# Patient Record
Sex: MALE | Employment: OTHER | URBAN - METROPOLITAN AREA
[De-identification: names, ages, dates, MRNs, and addresses within clinical notes are randomized per-mention and may not be internally consistent; named-entity substitution may affect disease eponyms.]

---

## 2021-07-09 ENCOUNTER — HOSPITAL ENCOUNTER (INPATIENT)
Age: 36
LOS: 1 days | Discharge: LEFT BEFORE DISCHARGE | DRG: 885 | End: 2021-07-10
Attending: EMERGENCY MEDICINE | Admitting: PSYCHIATRY & NEUROLOGY
Payer: COMMERCIAL

## 2021-07-09 ENCOUNTER — APPOINTMENT (OUTPATIENT)
Dept: CT IMAGING | Age: 36
End: 2021-07-09
Attending: EMERGENCY MEDICINE
Payer: COMMERCIAL

## 2021-07-09 ENCOUNTER — HOSPITAL ENCOUNTER (EMERGENCY)
Age: 36
Discharge: LEFT BEFORE DISCHARGE | End: 2021-07-09
Attending: EMERGENCY MEDICINE
Payer: COMMERCIAL

## 2021-07-09 VITALS
RESPIRATION RATE: 18 BRPM | DIASTOLIC BLOOD PRESSURE: 79 MMHG | OXYGEN SATURATION: 100 % | SYSTOLIC BLOOD PRESSURE: 125 MMHG | HEART RATE: 77 BPM

## 2021-07-09 DIAGNOSIS — R55 VASOVAGAL SYNCOPE: Primary | ICD-10-CM

## 2021-07-09 DIAGNOSIS — F20.9 SCHIZOPHRENIA, UNSPECIFIED TYPE (HCC): ICD-10-CM

## 2021-07-09 DIAGNOSIS — F99 MENTAL HEALTH DISORDER: Primary | ICD-10-CM

## 2021-07-09 DIAGNOSIS — Z53.29 LEFT AGAINST MEDICAL ADVICE: ICD-10-CM

## 2021-07-09 LAB
ALBUMIN SERPL-MCNC: 4.5 G/DL (ref 3.5–5)
ALBUMIN/GLOB SERPL: 1.1 {RATIO} (ref 1.1–2.2)
ALP SERPL-CCNC: 99 U/L (ref 45–117)
ALT SERPL-CCNC: 28 U/L (ref 12–78)
ANION GAP SERPL CALC-SCNC: 4 MMOL/L (ref 5–15)
APAP SERPL-MCNC: <2 UG/ML (ref 10–30)
AST SERPL-CCNC: 12 U/L (ref 15–37)
ATRIAL RATE: 71 BPM
BASOPHILS # BLD: 0 K/UL (ref 0–0.1)
BASOPHILS NFR BLD: 0 % (ref 0–1)
BILIRUB SERPL-MCNC: 0.8 MG/DL (ref 0.2–1)
BUN SERPL-MCNC: 9 MG/DL (ref 6–20)
BUN/CREAT SERPL: 11 (ref 12–20)
CALCIUM SERPL-MCNC: 10.1 MG/DL (ref 8.5–10.1)
CALCULATED P AXIS, ECG09: 68 DEGREES
CALCULATED R AXIS, ECG10: 50 DEGREES
CALCULATED T AXIS, ECG11: 43 DEGREES
CHLORIDE SERPL-SCNC: 101 MMOL/L (ref 97–108)
CO2 SERPL-SCNC: 30 MMOL/L (ref 21–32)
COMMENT, HOLDF: NORMAL
CREAT SERPL-MCNC: 0.81 MG/DL (ref 0.7–1.3)
DIAGNOSIS, 93000: NORMAL
DIFFERENTIAL METHOD BLD: ABNORMAL
EOSINOPHIL # BLD: 0.1 K/UL (ref 0–0.4)
EOSINOPHIL NFR BLD: 1 % (ref 0–7)
ERYTHROCYTE [DISTWIDTH] IN BLOOD BY AUTOMATED COUNT: 13.2 % (ref 11.5–14.5)
ETHANOL SERPL-MCNC: <10 MG/DL
GLOBULIN SER CALC-MCNC: 4 G/DL (ref 2–4)
GLUCOSE SERPL-MCNC: 108 MG/DL (ref 65–100)
HCT VFR BLD AUTO: 45.8 % (ref 36.6–50.3)
HGB BLD-MCNC: 15.6 G/DL (ref 12.1–17)
IMM GRANULOCYTES # BLD AUTO: 0 K/UL (ref 0–0.04)
IMM GRANULOCYTES NFR BLD AUTO: 0 % (ref 0–0.5)
LACTATE SERPL-SCNC: 1 MMOL/L (ref 0.4–2)
LYMPHOCYTES # BLD: 1.7 K/UL (ref 0.8–3.5)
LYMPHOCYTES NFR BLD: 21 % (ref 12–49)
MAGNESIUM SERPL-MCNC: 2.5 MG/DL (ref 1.6–2.4)
MCH RBC QN AUTO: 30.4 PG (ref 26–34)
MCHC RBC AUTO-ENTMCNC: 34.1 G/DL (ref 30–36.5)
MCV RBC AUTO: 89.1 FL (ref 80–99)
MONOCYTES # BLD: 0.3 K/UL (ref 0–1)
MONOCYTES NFR BLD: 4 % (ref 5–13)
NEUTS SEG # BLD: 5.8 K/UL (ref 1.8–8)
NEUTS SEG NFR BLD: 74 % (ref 32–75)
NRBC # BLD: 0 K/UL (ref 0–0.01)
NRBC BLD-RTO: 0 PER 100 WBC
P-R INTERVAL, ECG05: 148 MS
PLATELET # BLD AUTO: 164 K/UL (ref 150–400)
PMV BLD AUTO: 11.5 FL (ref 8.9–12.9)
POTASSIUM SERPL-SCNC: 4.4 MMOL/L (ref 3.5–5.1)
PROT SERPL-MCNC: 8.5 G/DL (ref 6.4–8.2)
Q-T INTERVAL, ECG07: 396 MS
QRS DURATION, ECG06: 86 MS
QTC CALCULATION (BEZET), ECG08: 430 MS
RBC # BLD AUTO: 5.14 M/UL (ref 4.1–5.7)
SALICYLATES SERPL-MCNC: <1.7 MG/DL (ref 2.8–20)
SAMPLES BEING HELD,HOLD: NORMAL
SODIUM SERPL-SCNC: 135 MMOL/L (ref 136–145)
TROPONIN I SERPL-MCNC: <0.05 NG/ML
VENTRICULAR RATE, ECG03: 71 BPM
WBC # BLD AUTO: 8 K/UL (ref 4.1–11.1)

## 2021-07-09 PROCEDURE — 93005 ELECTROCARDIOGRAM TRACING: CPT

## 2021-07-09 PROCEDURE — 83605 ASSAY OF LACTIC ACID: CPT

## 2021-07-09 PROCEDURE — 84443 ASSAY THYROID STIM HORMONE: CPT

## 2021-07-09 PROCEDURE — 82077 ASSAY SPEC XCP UR&BREATH IA: CPT

## 2021-07-09 PROCEDURE — 83735 ASSAY OF MAGNESIUM: CPT

## 2021-07-09 PROCEDURE — 80053 COMPREHEN METABOLIC PANEL: CPT

## 2021-07-09 PROCEDURE — 80179 DRUG ASSAY SALICYLATE: CPT

## 2021-07-09 PROCEDURE — 80143 DRUG ASSAY ACETAMINOPHEN: CPT

## 2021-07-09 PROCEDURE — 74011250636 HC RX REV CODE- 250/636: Performed by: EMERGENCY MEDICINE

## 2021-07-09 PROCEDURE — 96360 HYDRATION IV INFUSION INIT: CPT

## 2021-07-09 PROCEDURE — 36415 COLL VENOUS BLD VENIPUNCTURE: CPT

## 2021-07-09 PROCEDURE — 85025 COMPLETE CBC W/AUTO DIFF WBC: CPT

## 2021-07-09 PROCEDURE — 99285 EMERGENCY DEPT VISIT HI MDM: CPT

## 2021-07-09 PROCEDURE — 99283 EMERGENCY DEPT VISIT LOW MDM: CPT

## 2021-07-09 PROCEDURE — 84484 ASSAY OF TROPONIN QUANT: CPT

## 2021-07-09 RX ADMIN — SODIUM CHLORIDE 1000 ML: 9 INJECTION, SOLUTION INTRAVENOUS at 14:23

## 2021-07-09 NOTE — ED PROVIDER NOTES
71-year-old male with a history of schizoaffective schizophrenia presents to the emergency department by EMS for evaluation of a witnessed syncopal episode which occurred reportedly prior to arrival. He states that this happened while he was brushing his teeth when he said that he suddenly became lightheaded and passed out briefly. He states that he awoke on the floor and had no evidence of tongue biting or incontinence. No history of seizures. Blood glucose was 113 by EMS and he was ANO x3 throughout transport and on arrival to the ED. He states that he is traveling with his wife from [place] to [place] where they live. He denies any preceding chest pain, palpitations, shortness of breath, or preceding illness to this episode. He states that he now feels completely fine. Syncope   Associated symptoms include light-headedness. Pertinent negatives include no chest pain, no fever, no abdominal pain, no nausea, no vomiting, no congestion, no headaches, no back pain, no dizziness and no weakness. His past medical history is significant for syncope. Past Medical History:   Diagnosis Date    Schizo-affective schizophrenia [facility])        Past Surgical History:   Procedure Laterality Date    HX ORTHOPAEDIC      Right Leg         History reviewed. No pertinent family history.     Social History     Socioeconomic History    Marital status: Not on file     Spouse name: Not on file    Number of children: Not on file    Years of education: Not on file    Highest education level: Not on file   Occupational History    Not on file   Tobacco Use    Smoking status: Current Some Day Smoker    Smokeless tobacco: Never Used   Substance and Sexual Activity    Alcohol use: Not Currently    Drug use: Not Currently    Sexual activity: Not on file   Other Topics Concern    Not on file   Social History Narrative    Not on file     Social Determinants of Health     Financial Resource Strain:     Difficulty of Paying Living Expenses:    Food Insecurity:     Worried About [address] [facility] in the Last Year:     [address] in the Last Year:    Transportation Needs:     Lack of Transportation (Medical):  Lack of Transportation (Non-Medical):    Physical Activity:     Days of Exercise per Week:     Minutes of Exercise per Session:    Stress:     Feeling of Stress :    Social Connections:     Frequency of Communication with Friends and Family:     Frequency of Social Gatherings with Friends and Family:     Attends Nondenominational Services:     Active Member of Clubs or Organizations:     Attends Club or Organization Meetings:     Marital Status:    Intimate Partner Violence:     Fear of Current or Ex-Partner:     Emotionally Abused:     Physically Abused:     Sexually Abused: ALLERGIES: Patient has no known allergies. Review of Systems   Constitutional: Positive for activity change. Negative for appetite change, chills and fever. HENT: Negative for congestion, rhinorrhea, sinus pain, sneezing and sore throat. Eyes: Negative for photophobia and visual disturbance. Respiratory: Negative for cough and shortness of breath. Cardiovascular: Positive for syncope. Negative for chest pain. Gastrointestinal: Negative for abdominal pain, blood in stool, constipation, diarrhea, nausea and vomiting. Genitourinary: Negative for difficulty urinating, dysuria, flank pain, hematuria, penile pain and testicular pain. Musculoskeletal: Negative for arthralgias, back pain, myalgias and neck pain. Skin: Negative for rash and wound. Neurological: Positive for syncope and light-headedness. Negative for dizziness, facial asymmetry, speech difficulty, weakness, numbness and headaches. Psychiatric/Behavioral: Negative for self-injury and suicidal ideas. All other systems reviewed and are negative.       Vitals:    07/09/21 1335 07/09/21 1336 07/09/21 1400   BP: 125/73  129/75   Pulse: 74  73   Resp: 16 15   SpO2: 100% 100% 99%            Physical Exam  Vitals and nursing note reviewed. Constitutional:       General: He is not in acute distress. Appearance: Normal appearance. He is well-developed. He is not diaphoretic. HENT:      Head: Normocephalic and atraumatic. Nose: Nose normal.      Mouth/Throat:      Comments: No evidence of tongue trauma. Eyes:      Extraocular Movements: Extraocular movements intact. Conjunctiva/sclera: Conjunctivae normal.      Pupils: Pupils are equal, round, and reactive to light. Cardiovascular:      Rate and Rhythm: Normal rate and regular rhythm. Heart sounds: Normal heart sounds. Pulmonary:      Effort: Pulmonary effort is normal.      Breath sounds: Normal breath sounds. Abdominal:      General: There is no distension. Palpations: Abdomen is soft. Tenderness: There is no abdominal tenderness. Musculoskeletal:         General: No tenderness. Cervical back: Normal range of motion and neck supple. Skin:     General: Skin is warm and dry. Neurological:      General: No focal deficit present. Mental Status: He is alert and oriented to person, place, and time. Cranial Nerves: No cranial nerve deficit. Sensory: No sensory deficit. Motor: No weakness. Coordination: Coordination normal.      Comments: Intact sensation, 5/5 strength in all 4 extremities, intact finger to nose, neg pronator drift, fluent speech, CN intact. MDM   39-year-old male presents with syncopal episode prior to arrival while brushing teeth. Suspect vasovagal type etiology given no focal neuro deficits and no evidence of seizure. Labs returned reassuringly showing no significant abnormalities, no leukocytosis or lactic acidosis, negative troponin.     While these results were discussed with the patient at the bedside his wife states that he has been acting more erratic and disorganized recently and has been noncompliant with his medications over the last couple of days. No expressed SI or HI but she is concerned that his mental health is declining and requests that he be evaluated by [facility]. BSMART was consulted and saw the patient at the bedside and they were concerned about his mental health progressing to more disorganized schizophrenia so they said that they would further discuss admission options. Patient was not interested in being admitted and this is not going to be  Voluntary. Reportedly while the patient's wife was discussing his symptoms in the quiet room with BSMART, the patient eloped. Procedures    1333 EKG shows normal sinus rhythm with a rate of 71 bpm with no acute ST or T wave abnormalities suggestive of ischemia. 4:45 I was notified by nursing staff that it appears that the patient may have eloped. Security was called to try to locate the patient.

## 2021-07-09 NOTE — ED NOTES
Came out of another room and patient was not in room. Wife states she does not know where he went. Bsmart aware and security notified.

## 2021-07-09 NOTE — ED NOTES
Pt's wife concerned about patient's mental health. Dr Moshe Helms to bedside and will consult BSmart.

## 2021-07-09 NOTE — ED TRIAGE NOTES
Triage Note: Patient arrives by EMS from a hotel following a witnessed syncopal episode. Patient A & O x 3 upon arrival.  per EMS. Patient reports he is traveling with his wife from [place] to [place] where they live.

## 2021-07-09 NOTE — BSMART NOTE
Comprehensive Assessment Form Part 1      Section I - Disposition    Axis I - Psychotic Disorder, Unspecified; Unspecified Mood Disorder    Axis II - Deferred  Axis III - Vasovagal Syncope      The Medical Doctor to Psychiatrist conference was not completed. The Medical Doctor is in agreement with Psychiatrist disposition because of (reason) N/A. The plan is request for TDO evaluation. The on-call Psychiatrist consulted was Dr. Boogie Duarte . The admitting Psychiatrist will be Dr. Deepa Marino. The admitting Diagnosis is Psychotic Disorder, NOS  The Payor source is unknown. Section II - Integrated Summary  Summary:      Patient is a 77-year-old male who was brought to ER by EMS following a syncopal episode in which he fell in his hotel bathroom. As patient was being prepared for discharge from the ER, patient's wife Peter Ceballos) expressed concerns about his behavior, adding that he was recently diagnosed with schizophrenia, prompting a request for a mental-health evaluation. In current interview, patient initially presented as very sleepy, nearly incoherent, but he did give writer verbal permission to speak with patient's wife. Wife repots that patient was \"normal\" until [place] three months ago,\" at which time he began to exhibit unusual behavior, such as talking to himself, increasing agitation and irritability, isolation, mood instability with episodes of crying. She reports that Saumya Matias was [employer] in [place], sent to [facility] involuntarily about three weeks ago. She states that he was released \"with a cocktail of medications\" that \"he refused to take,\" agreeing only to take Trazodone and Clonazepam.  When asked, wife stated that \"cocktail\" was her word, and that she can't remember the names of the other prescribed medication.     Patient and family were in [address] [place] [address] visiting family, but symptoms reportedly got worse, with patient beginning to \"elaborate stories,\" to explain his behavior, locking himself in the bathroom for hours, staring at himself in the mirror, crying for hours for no clear reason. \"He goes into a really dark place, won't answer. \"  \"He's up and down,\" with a significant decrease in appetite. Patient reportedly was so anxious last night that he didn't sleep at all, was crying all night but not able to explain why. Today, patient reportedly was repeatedly looking under the mattress and in the closet in their motel room, then went into the bathroom, locking himself in and, eventually, falling, prompting wife to call EMS. 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 She reports that she's known him for 10 years, with no previous bizarre behavior. She stated that patient's grandmother Memo Vanceon a nervous breakdown,\" and that an aunt has Bipolar Disorder. Regarding current situation, wife states, \"I don't feel safe with him,\" and, \"We have a 15-hour drive. She stated that \"he gets really angry\" in the car, e.g. at traffic. She states that it is not like him and scares her, adding that she is scared when he is driving. In current interview patient was wandering around his hospital room, was asked to get back into bed. He presented as distracted, confused, disoriented, stating only that he's \"in a hospital. In [place], I believe,\" and was unable to state the date or the month. He spoke softly with apparent thought blocking. He denied any problem aside from falling this morning, stating, \"I'm ready to leave. \"  Patient denied any previous hospitalizations but, when asked about hospitalization in [place], stated, \"Because I was hospitalized. Maybe I was hearing a voice. \"  He is unable to provide historical information, even about earlier today, unless asked about specific events.   Asked about his prescribed medication, he stated, \"Anxiety, but I told them that I don't have no anxiety. \"  Asked about his reported behavior at the hotel today, patient stated, \"Maybe I was making the bed or something. \"  \"I do talk to myself, but I know for sure there's nothing wrong with me. \"  He stated that he is not willing to be admitted to the hospital.    Because patient appears to lack capacity to consent to treatment necessitated by his inability to care for himself and his wife feeling unsafe with him, writer discussed TDO process with wife, who stated that she understands how it works--\"We've just been through that,\" and that she feels it is necessary. The plan is to call Andrea LANIER to request a TDO evaluation. Patient left the ER before a TDO evaluation could be requested. ER notified Security and [employer]. Patient's wife remains at the hospital.       The patient has not demonstrated mental capacity to provide informed consent. The information is given by the spouse/SO. The Chief Complaint is confusion, psychotic symptoms. The Precipitant Factors are unknown. Previous Hospitalizations: [facility], 6/2021. The patient has been in restraints in the past and has not escaped from them. Current Psychiatrist and/or  is N/A. Lethality Assessment:    The potential for suicide noted by the following: active psychosis . The potential for homicide is not noted. The patient has not been a perpetrator of sexual or physical abuse. There are not pending charges. The patient is felt to be at risk for self harm or harm to others. Section III - Psychosocial  The patient's overall mood and attitude is confused. Feelings of helplessness and hopelessness are not observed. Generalized anxiety is observed by family report. Panic is not observed. Phobias are not observed. Obsessive compulsive tendencies are observed by report of wife. Section IV - Mental Status Exam  The patient's appearance is tense. The patient's behavior is guarded.  The patient is disoriented. The patient's speech is soft. The patient's mood is withdrawn. The range of affect is constricted. The patient's thought content demonstrates paranoia. The thought process is blocking. The patient's perception demonstrated changes in the following: paranoid ideation. The patient's memory is impaired. The patient's appetite is decreased. The patient's sleep has evidence of insomnia. The patient shows no insight. The patient's judgement is psychologically impaired. Section V - Substance Abuse  The patient is not using substances. The patient is using tobacco by inhalation for greater than 10 years with last use on today. Section VI - Living Arrangements  The patient is . The spouses approximate age is 27 and appears to be in good health. The patient lives with a spouse. The patient has no children. The patient does plan to return home upon discharge. The patient does not have legal issues pending. The patient's source of income comes from employment. Spiritism and cultural practices have not been voiced at this time. The patient's greatest support comes from wife and this person will be involved with the treatment. The patient has not been in an event described as horrible or outside the realm of ordinary life experience either currently or in the past.  The patient has not been a victim of sexual/physical abuse. Section VII - Other Areas of Clinical Concern  The highest grade achieved is unknown with the overall quality of school experience being described as unknown. The patient is currently employed and speaks [place] as a primary language. The patient has no communication impairments affecting communication. The patient's preference for learning can be described as: can read and write adequately.   The patient's hearing is normal.  The patient's vision is normal.      Festus Dural

## 2021-07-10 ENCOUNTER — APPOINTMENT (OUTPATIENT)
Dept: CT IMAGING | Age: 36
DRG: 885 | End: 2021-07-10
Attending: EMERGENCY MEDICINE
Payer: COMMERCIAL

## 2021-07-10 ENCOUNTER — HOSPITAL ENCOUNTER (INPATIENT)
Age: 36
LOS: 4 days | Discharge: HOME | DRG: 885 | End: 2021-07-15
Attending: EMERGENCY MEDICINE | Admitting: PSYCHIATRY & NEUROLOGY
Payer: COMMERCIAL

## 2021-07-10 VITALS
DIASTOLIC BLOOD PRESSURE: 80 MMHG | OXYGEN SATURATION: 97 % | TEMPERATURE: 98.5 F | HEART RATE: 78 BPM | SYSTOLIC BLOOD PRESSURE: 134 MMHG | RESPIRATION RATE: 18 BRPM

## 2021-07-10 DIAGNOSIS — F48.9 MENTAL HEALTH PROBLEM: Primary | ICD-10-CM
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
## 2021-07-10 PROBLEM — F25.9 SCHIZOAFFECTIVE DISORDER (HCC): Status: ACTIVE | Noted: 2021-07-10

## 2021-07-10 LAB
ALBUMIN SERPL-MCNC: 3.9 G/DL (ref 3.5–5)
ALBUMIN/GLOB SERPL: 1.1 {RATIO} (ref 1.1–2.2)
ALP SERPL-CCNC: 88 U/L (ref 45–117)
ALT SERPL-CCNC: 23 U/L (ref 12–78)
AMPHET UR QL SCN: NEGATIVE
ANION GAP SERPL CALC-SCNC: 7 MMOL/L (ref 5–15)
AST SERPL-CCNC: 13 U/L (ref 15–37)
BARBITURATES UR QL SCN: NEGATIVE
BASOPHILS # BLD: 0 K/UL (ref 0–0.1)
BASOPHILS NFR BLD: 0 % (ref 0–1)
BENZODIAZ UR QL: NEGATIVE
BILIRUB SERPL-MCNC: 0.9 MG/DL (ref 0.2–1)
BUN SERPL-MCNC: 14 MG/DL (ref 6–20)
BUN/CREAT SERPL: 19 (ref 12–20)
CALCIUM SERPL-MCNC: 9.7 MG/DL (ref 8.5–10.1)
CANNABINOIDS UR QL SCN: NEGATIVE
CHLORIDE SERPL-SCNC: 104 MMOL/L (ref 97–108)
CK SERPL-CCNC: 72 U/L (ref 39–308)
CO2 SERPL-SCNC: 25 MMOL/L (ref 21–32)
COCAINE UR QL SCN: NEGATIVE
CREAT SERPL-MCNC: 0.75 MG/DL (ref 0.7–1.3)
DIFFERENTIAL METHOD BLD: NORMAL
DRUG SCRN COMMENT,DRGCM: NORMAL
EOSINOPHIL # BLD: 0.1 K/UL (ref 0–0.4)
EOSINOPHIL NFR BLD: 1 % (ref 0–7)
ERYTHROCYTE [DISTWIDTH] IN BLOOD BY AUTOMATED COUNT: 12.9 % (ref 11.5–14.5)
ETHANOL SERPL-MCNC: <10 MG/DL
FLUAV RNA SPEC QL NAA+PROBE: NOT DETECTED
FLUBV RNA SPEC QL NAA+PROBE: NOT DETECTED
GLOBULIN SER CALC-MCNC: 3.6 G/DL (ref 2–4)
GLUCOSE SERPL-MCNC: 88 MG/DL (ref 65–100)
HCT VFR BLD AUTO: 44.4 % (ref 36.6–50.3)
HGB BLD-MCNC: 15 G/DL (ref 12.1–17)
IMM GRANULOCYTES # BLD AUTO: 0 K/UL (ref 0–0.04)
IMM GRANULOCYTES NFR BLD AUTO: 0 % (ref 0–0.5)
LYMPHOCYTES # BLD: 2.1 K/UL (ref 0.8–3.5)
LYMPHOCYTES NFR BLD: 27 % (ref 12–49)
MCH RBC QN AUTO: 30 PG (ref 26–34)
MCHC RBC AUTO-ENTMCNC: 33.8 G/DL (ref 30–36.5)
MCV RBC AUTO: 88.8 FL (ref 80–99)
METHADONE UR QL: NEGATIVE
MONOCYTES # BLD: 0.5 K/UL (ref 0–1)
MONOCYTES NFR BLD: 7 % (ref 5–13)
NEUTS SEG # BLD: 5.2 K/UL (ref 1.8–8)
NEUTS SEG NFR BLD: 65 % (ref 32–75)
NRBC # BLD: 0 K/UL (ref 0–0.01)
NRBC BLD-RTO: 0 PER 100 WBC
OPIATES UR QL: NEGATIVE
PCP UR QL: NEGATIVE
PLATELET # BLD AUTO: 153 K/UL (ref 150–400)
PMV BLD AUTO: 11 FL (ref 8.9–12.9)
POTASSIUM SERPL-SCNC: 3.6 MMOL/L (ref 3.5–5.1)
PROT SERPL-MCNC: 7.5 G/DL (ref 6.4–8.2)
RBC # BLD AUTO: 5 M/UL (ref 4.1–5.7)
SARS-COV-2, COV2: NOT DETECTED
SODIUM SERPL-SCNC: 136 MMOL/L (ref 136–145)
TSH SERPL DL<=0.05 MIU/L-ACNC: 0.71 UIU/ML (ref 0.36–3.74)
WBC # BLD AUTO: 8 K/UL (ref 4.1–11.1)

## 2021-07-10 PROCEDURE — 87636 SARSCOV2 & INF A&B AMP PRB: CPT

## 2021-07-10 PROCEDURE — 72125 CT NECK SPINE W/O DYE: CPT

## 2021-07-10 PROCEDURE — 80307 DRUG TEST PRSMV CHEM ANLYZR: CPT

## 2021-07-10 PROCEDURE — 99285 EMERGENCY DEPT VISIT HI MDM: CPT

## 2021-07-10 PROCEDURE — 65270000029 HC RM PRIVATE

## 2021-07-10 PROCEDURE — 82550 ASSAY OF CK (CPK): CPT

## 2021-07-10 PROCEDURE — 90791 PSYCH DIAGNOSTIC EVALUATION: CPT

## 2021-07-10 PROCEDURE — 70450 CT HEAD/BRAIN W/O DYE: CPT

## 2021-07-10 PROCEDURE — 36415 COLL VENOUS BLD VENIPUNCTURE: CPT

## 2021-07-10 PROCEDURE — 85025 COMPLETE CBC W/AUTO DIFF WBC: CPT

## 2021-07-10 PROCEDURE — 80053 COMPREHEN METABOLIC PANEL: CPT

## 2021-07-10 PROCEDURE — 82077 ASSAY SPEC XCP UR&BREATH IA: CPT

## 2021-07-10 RX ORDER — HYDROXYZINE 25 MG/1
50 TABLET, FILM COATED ORAL
Status: DISCONTINUED | OUTPATIENT
Start: 2021-07-10 | End: 2021-07-10 | Stop reason: HOSPADM

## 2021-07-10 RX ORDER — ADHESIVE BANDAGE
30 BANDAGE TOPICAL DAILY PRN
Status: DISCONTINUED | OUTPATIENT
Start: 2021-07-10 | End: 2021-07-10 | Stop reason: HOSPADM

## 2021-07-10 RX ORDER — TRAZODONE HYDROCHLORIDE 50 MG/1
50 TABLET ORAL
Status: DISCONTINUED | OUTPATIENT
Start: 2021-07-10 | End: 2021-07-10 | Stop reason: HOSPADM

## 2021-07-10 RX ORDER — OLANZAPINE 5 MG/1
5 TABLET ORAL
Status: DISCONTINUED | OUTPATIENT
Start: 2021-07-10 | End: 2021-07-10 | Stop reason: HOSPADM

## 2021-07-10 RX ORDER — LORAZEPAM 2 MG/ML
1 INJECTION INTRAMUSCULAR
Status: DISCONTINUED | OUTPATIENT
Start: 2021-07-10 | End: 2021-07-10 | Stop reason: HOSPADM

## 2021-07-10 RX ORDER — ACETAMINOPHEN 325 MG/1
650 TABLET ORAL
Status: DISCONTINUED | OUTPATIENT
Start: 2021-07-10 | End: 2021-07-10 | Stop reason: HOSPADM

## 2021-07-10 RX ORDER — HALOPERIDOL 5 MG/ML
5 INJECTION INTRAMUSCULAR
Status: DISCONTINUED | OUTPATIENT
Start: 2021-07-10 | End: 2021-07-10 | Stop reason: HOSPADM

## 2021-07-10 RX ORDER — BENZTROPINE MESYLATE 1 MG/1
1 TABLET ORAL
Status: DISCONTINUED | OUTPATIENT
Start: 2021-07-10 | End: 2021-07-10 | Stop reason: HOSPADM

## 2021-07-10 RX ORDER — DIPHENHYDRAMINE HYDROCHLORIDE 50 MG/ML
50 INJECTION, SOLUTION INTRAMUSCULAR; INTRAVENOUS
Status: DISCONTINUED | OUTPATIENT
Start: 2021-07-10 | End: 2021-07-10 | Stop reason: HOSPADM

## 2021-07-10 NOTE — PROGRESS NOTES
TRANSFER - IN REPORT:    Verbal report received from [facility], on [facility]  being received from [facility](unit) for routine progression of care      Report consisted of patients Situation, Background, Assessment and   Recommendations(SBAR). Information from the following report(s) SBAR was reviewed with the receiving nurse. Opportunity for questions and clarification was provided. Assessment completed upon patients arrival to unit and care assumed.

## 2021-07-10 NOTE — BSMART NOTE
Comprehensive Assessment Form Part 1    Section I - Disposition    Axis I - Schizoaffective Disorder ([facility] [address] Bipolar Type, with Catatonia; R/O Bipolar 1 Disorder, Severe, with mood-congruent psychotic features, with catatonia (staring, negativism and mannerism)  Axis II - Deferred  Axis III -   Past Medical History:   Diagnosis Date    Schizo-affective schizophrenia ([facility] [address]    Axis IV - Lack of sufficient MH hx in ED setting    The Medical Doctor to Psychiatrist conference was not completed. The Medical Doctor is in agreement with Psychiatrist disposition because of (reason) pt presents to ED on a VOL basis with psychosis. The plan is to present pt for admission on a VOL basis. The on-call Psychiatrist consulted was Dr. Adore Schmitt  The admitting Psychiatrist will be Dr. Adore Schmitt  The admitting Diagnosis is:  Schizoaffective Disorder ([facility] [address] Bipolar Type, with Catatonia; R/O Bipolar 1 Disorder, Severe, with mood-congruent psychotic features, with catatonia (staring, negativism and mannerism)  The Payor source is: Coverage information not on file     Section II - Integrated Summary  Summary:  Pt is a 38 yo SWM who presents to the ED on a VOL basis for a second evaluation by [facility] in a five hour period. Pt denies SI/HI/AVH to this writer and is oriented to person and place, but not day/date of the week or situation. Pt reports that he and his wife were passing through [place] on vacation and he passed out after he \"\"ate something\" this morning. He states that his wife brought him to the ED to have him medically examined and expressed her concern for pt's \"bizarre\" and \"uncharacteristic\" behavior. Pt gives this writer permission to contact his wife, Arminda Vazquez, who checked into a hotel after pt eloped AMA from the ED at 17:25. Pt represented to the ED at 20:15, was escorted into the ED by [facility] and re-registered as a VOL pt at 20:45    Pt provided this pt with permission to speak with his wife, Arminda Vazquez to gather collateral information. She states that pt has presented to be \"spacy\" and \"unresponsive to his name even after stating it up to four times\". She states that she has evidenced pt to be pacing the floor all night keeping them both awake, that he has been complaining of a dry mouth and frequent urination during both the day and nighttime. She reports that pt passed out today while he was having \"another episode\" looking behind the couch and in the closets for money (per pt), he was also looking in the mirror for extended period of time, and fixated on watching the water run from the faucet for \"hours at a time\". She reports that pt was diagnosed with Schizophrenia three weeks ago when he was admitted to [place] [address] by [address] [place] [address]. Cheli stats that pt was prescribed Clonazipam 0.5mg; Trazadone, 7.5 mg. But he has refused to take his medications. She states that pt received a second opinion after discharge four days later by Dr. Melissa Dunlap who minimized pt's presentation to a \"stress response\". She reports that pt's grandmother suffered from \"mental breakdown\" and his maternal aunt is diagnosed with Bipolar Disorder, who self-medicates with alternative substances. The patient has demonstrated mental capacity to provide informed consent. The information is given by the patient and spouse/SO. The Chief Complaint is: \"pt reports, I was eating something earlier today and I passed out so my wife brought me to the ED for treatment. \"  The Precipitant Factors are: Pt reports hx of anxiety  Previous Hospitalizations: Pt denies  The patient has not previously been in restraints. Current Psychiatrist and/or  is: Dr. Anton Gutierrez, [address] [place] [address]. [place], [place] ([phone]); Dr. Jose Golden, PCP, [place], [place]    Lethality Assessment:    The potential for suicide noted by the following: pt denies. The potential for homicide is not noted. The patient has not been a perpetrator of sexual or physical abuse. There are not pending charges.   The patient is felt to be at risk for self harm or harm to others. The attending nurse was advised the patient needs supervision. Section III - Psychosocial  The patient's overall mood and attitude is calm and cooperative  Feelings of helplessness and hopelessness are not observed. Generalized anxiety is not observed. Panic is not observed. Phobias are not observed. Obsessive compulsive tendencies are not observed. Section IV - Mental Status Exam  The patient's appearance shows no evidence of impairment. The patient's behavior shows no evidence of impairment. The patient is only aware of  place and person. The patient's speech shows no evidence of impairment. The patient's mood is withdrawn. The range of affect is flat. The patient's thought content demonstrates no evidence of impairment. The thought process shows no evidence of impairment. The patient's perception shows no evidence of impairment. The patient's memory is impaired. The patient's appetite is decreased and shows signs of weight loss. The patient's sleep has evidence of insomnia. The patient shows no insight. The patient's judgement is psychologically impaired. Section V - Substance Abuse  The patient is using substances. The patient is using tobacco by inhalation for 5-10 years with last use on occassion. The patient has experienced the following withdrawal symptoms: N/A. Section VI - Living Arrangements  The patient is . The spouses approximate age is 39 and appears to be in good health. The patient lives with a spouse. The patient has no children. The patient does plan to return home upon discharge. The patient does not have legal issues pending. The patient's source of income comes from employment. Jainism and cultural practices have not been voiced at this time. The patient's greatest support comes from his wife; and this person will be involved with the treatment.     The patient has not been in an event described as horrible or outside the realm of ordinary life experience either currently or in the past.  The patient has not been a victim of sexual/physical abuse. Section VII - Other Areas of Clinical Concern  The highest grade achieved is high school graduate with the overall quality of school experience being described as not noted. The patient is currently employed and speaks [place] as a primary language. The patient has no communication impairments affecting communication. The patient's preference for learning can be described as: can read and write adequately.   The patient's hearing is normal.  The patient's vision is normal.    Bernadette Rosa MA, [place], Licensed Resident in Counseling

## 2021-07-10 NOTE — ED NOTES
Patient unable to void at this time. Provided patient with water and ginger ale per request. Call bell within reach.

## 2021-07-10 NOTE — ED NOTES
The patient Is resting and is cooperative. He is offered a fresh blanket, urinal and advised his breakfast tray is ordered. He is polite and thankful. He is given the call light and advised to call for any needs.

## 2021-07-10 NOTE — [facility] NOTE
[facility]

## 2021-07-10 NOTE — ED NOTES
Verbal shift change report given to Felipa Wong (oncoming nurse) by Altaf Bansal (offgoing nurse). Report included the following information SBAR, Kardex, ED Summary and Recent Results.

## 2021-07-10 NOTE — ED NOTES
Pt. Refused admission. He was evaluated for TDO but no TDO was issued. Pt. Is leaving against medical advice.      Anay Qureshi MD  11:27 AM

## 2021-07-10 NOTE — ED PROVIDER NOTES
HPI here earlier today and was being evaluated by a b smart, for possible admission due to paranoid behavior. He eloped during the evaluation. He is brought back now [place] and is to be evaluated by [place] [facility]. At the present time the patient is calm and denies suicidal/homicidal ideation. He denies hallucinations. He denies alcohol/drug use. He denies recent illness. Past Medical History:   Diagnosis Date    Schizo-affective schizophrenia [facility])        Past Surgical History:   Procedure Laterality Date    HX ORTHOPAEDIC      Right Leg         History reviewed. No pertinent family history. Social History     Socioeconomic History    Marital status:      Spouse name: Not on file    Number of children: Not on file    Years of education: Not on file    Highest education level: Not on file   Occupational History    Not on file   Tobacco Use    Smoking status: Current Some Day Smoker    Smokeless tobacco: Never Used   Substance and Sexual Activity    Alcohol use: Not Currently    Drug use: Not Currently    Sexual activity: Not on file   Other Topics Concern    Not on file   Social History Narrative    Not on file     Social Determinants of Health     Financial Resource Strain:     Difficulty of Paying Living Expenses:    Food Insecurity:     Worried About Running Out of Food in the Last Year:     [address] in the Last Year:    Transportation Needs:     Lack of Transportation (Medical):      Lack of Transportation (Non-Medical):    Physical Activity:     Days of Exercise per Week:     Minutes of Exercise per Session:    Stress:     Feeling of Stress :    Social Connections:     Frequency of Communication with Friends and Family:     Frequency of Social Gatherings with Friends and Family:     Attends [employer] Services:     Active Member of Clubs or Organizations:     Attends Club or Organization Meetings:     Marital Status:    Intimate Partner Violence:     Fear of Current or Ex-Partner:     Emotionally Abused:     Physically Abused:     Sexually Abused: ALLERGIES: Patient has no known allergies. Review of Systems   Constitutional: Negative for fever. Respiratory: Negative for shortness of breath. Gastrointestinal: Negative for abdominal pain and vomiting. Neurological: Negative for headaches. Psychiatric/Behavioral: Negative for hallucinations and suicidal ideas. Vitals:    07/09/21 2107   BP: 138/75   Pulse: 76   Resp: 16   Temp: 97.4 °F (36.3 °C)   SpO2: 99%            Physical Exam  Constitutional:       General: He is not in acute distress. Appearance: He is well-developed. HENT:      Head: Normocephalic. Eyes:      Pupils: Pupils are equal, round, and reactive to light. Cardiovascular:      Rate and Rhythm: Normal rate. Heart sounds: No murmur heard. Pulmonary:      Effort: Pulmonary effort is normal.      Breath sounds: Normal breath sounds. Abdominal:      Palpations: Abdomen is soft. Tenderness: There is no abdominal tenderness. Musculoskeletal:         General: Normal range of motion. Cervical back: Normal range of motion. Skin:     General: Skin is warm and dry. Capillary Refill: Capillary refill takes less than 2 seconds. Neurological:      Mental Status: He is alert.           MDM       Procedures

## 2021-07-10 NOTE — ED NOTES
Pt is assessed by behavioral health and is deemed stable in terms of not meeting TDO/ECO criteria. She speaks with his wife and she agrees to come and pick him up. The patient leaves A.  He is given discharge papers for follow up

## 2021-07-10 NOTE — ROUTINE PROCESS
TRANSFER - OUT REPORT:    Verbal [facility]ena [facility] (name[facility] ([facility]) for routine progression of care       Report consisted of patients Situation, Background, Assessment and   Recommendations(SBAR). Information from the following report(s) SBAR, ED Summary and MAR was reviewed with the receiving nurse. Lines:       Opportunity for questions and clarification was provided.       Patient transported with:   Registered Nurse

## 2021-07-10 NOTE — ED TRIAGE NOTES
Triage Note: Patient here to be evaluated by [facility]. Patient was seen here earlier today but eloped prior to being evaluated. Patient displaying paranoid behavior. Denies SI/HI. Patient escorted back into building by HPD. Per HPD patient does not meet ECO criteria. Patient not voluntary for admission at this time. BSMART notified. BSMART will contact [facility] at this time.

## 2021-07-11 LAB
AMPHET UR QL SCN: NEGATIVE
APPEARANCE UR: CLEAR
BACTERIA URNS QL MICRO: NEGATIVE /HPF
BARBITURATES UR QL SCN: NEGATIVE
BENZODIAZ UR QL: NEGATIVE
BILIRUB UR QL: NEGATIVE
CANNABINOIDS UR QL SCN: NEGATIVE
COCAINE UR QL SCN: NEGATIVE
COLOR UR: ABNORMAL
DRUG SCRN COMMENT,DRGCM: NORMAL
EPITH CASTS URNS QL MICRO: ABNORMAL /LPF
FLUAV RNA SPEC QL NAA+PROBE: NOT DETECTED
FLUBV RNA SPEC QL NAA+PROBE: NOT DETECTED
GLUCOSE UR STRIP.AUTO-MCNC: NEGATIVE MG/DL
HGB UR QL STRIP: NEGATIVE
HYALINE CASTS URNS QL MICRO: ABNORMAL /LPF (ref 0–5)
KETONES UR QL STRIP.AUTO: 80 MG/DL
LEUKOCYTE ESTERASE UR QL STRIP.AUTO: NEGATIVE
METHADONE UR QL: NEGATIVE
NITRITE UR QL STRIP.AUTO: NEGATIVE
OPIATES UR QL: NEGATIVE
PCP UR QL: NEGATIVE
PH UR STRIP: 6 [PH] (ref 5–8)
PROT UR STRIP-MCNC: NEGATIVE MG/DL
RBC #/AREA URNS HPF: ABNORMAL /HPF (ref 0–5)
SARS-COV-2, COV2: NOT DETECTED
SP GR UR REFRACTOMETRY: 1.03 (ref 1–1.03)
UROBILINOGEN UR QL STRIP.AUTO: 1 EU/DL (ref 0.2–1)
WBC URNS QL MICRO: ABNORMAL /HPF (ref 0–4)

## 2021-07-11 PROCEDURE — 65220000003 HC RM SEMIPRIVATE PSYCH

## 2021-07-11 PROCEDURE — 87636 SARSCOV2 & INF A&B AMP PRB: CPT

## 2021-07-11 PROCEDURE — 74011250637 HC RX REV CODE- 250/637: Performed by: NURSE PRACTITIONER

## 2021-07-11 PROCEDURE — 81001 URINALYSIS AUTO W/SCOPE: CPT

## 2021-07-11 PROCEDURE — 80307 DRUG TEST PRSMV CHEM ANLYZR: CPT

## 2021-07-11 RX ORDER — TRAZODONE HYDROCHLORIDE 50 MG/1
50 TABLET ORAL
Status: DISCONTINUED | OUTPATIENT
Start: 2021-07-11 | End: 2021-07-15 | Stop reason: HOSPADM

## 2021-07-11 RX ORDER — HYDROXYZINE 50 MG/1
50 TABLET, FILM COATED ORAL
Status: DISCONTINUED | OUTPATIENT
Start: 2021-07-11 | End: 2021-07-15 | Stop reason: HOSPADM

## 2021-07-11 RX ORDER — LORAZEPAM 2 MG/ML
1 INJECTION INTRAMUSCULAR
Status: DISCONTINUED | OUTPATIENT
Start: 2021-07-11 | End: 2021-07-15 | Stop reason: HOSPADM

## 2021-07-11 RX ORDER — BENZTROPINE MESYLATE 1 MG/1
1 TABLET ORAL
Status: DISCONTINUED | OUTPATIENT
Start: 2021-07-11 | End: 2021-07-15 | Stop reason: HOSPADM

## 2021-07-11 RX ORDER — HALOPERIDOL 5 MG/ML
5 INJECTION INTRAMUSCULAR
Status: DISCONTINUED | OUTPATIENT
Start: 2021-07-11 | End: 2021-07-15 | Stop reason: HOSPADM

## 2021-07-11 RX ORDER — OLANZAPINE 5 MG/1
5 TABLET ORAL
Status: DISCONTINUED | OUTPATIENT
Start: 2021-07-11 | End: 2021-07-15 | Stop reason: HOSPADM

## 2021-07-11 RX ORDER — DIPHENHYDRAMINE HYDROCHLORIDE 50 MG/ML
50 INJECTION, SOLUTION INTRAMUSCULAR; INTRAVENOUS
Status: DISCONTINUED | OUTPATIENT
Start: 2021-07-11 | End: 2021-07-15 | Stop reason: HOSPADM

## 2021-07-11 RX ORDER — ADHESIVE BANDAGE
30 BANDAGE TOPICAL DAILY PRN
Status: DISCONTINUED | OUTPATIENT
Start: 2021-07-11 | End: 2021-07-15 | Stop reason: HOSPADM

## 2021-07-11 RX ORDER — ACETAMINOPHEN 325 MG/1
650 TABLET ORAL
Status: DISCONTINUED | OUTPATIENT
Start: 2021-07-11 | End: 2021-07-15 | Stop reason: HOSPADM

## 2021-07-11 RX ADMIN — ACETAMINOPHEN 650 MG: 325 TABLET ORAL at 21:28

## 2021-07-11 RX ADMIN — TRAZODONE HYDROCHLORIDE 50 MG: 50 TABLET ORAL at 21:28

## 2021-07-11 NOTE — ED NOTES
TRANSFER - OUT REPORT:    Verbal report given to Puja Alatorre RN on Kalin Mckeon  being transferred to [facility] for routine progression of care       Report consisted of patients Situation, Background, Assessment and   Recommendations(SBAR). Information from the following report(s) ED Summary was reviewed with the receiving nurse. Lines:       Opportunity for questions and clarification was provided.       Patient transported with:   Registered Nurse   RAMONA

## 2021-07-11 NOTE — ED TRIAGE NOTES
Patient arrives via [facility]. It is reported that the patient was found laying in the parking lot of the [facility]. EMS reports that he is alert, but not answering questions. However he reports a fall. EMS was called because was laying in the parking lot. No SI/HI.

## 2021-07-11 NOTE — PROGRESS NOTES
Problem: Anxiety-Behavioral Health (Adult/Pediatric)  Goal: Interventions  Outcome: Progressing Towards Goal  Note: Pt is alert. Flat affect. Anxious. Poor concentration. Thought blocking. Not answering most questions. Tolerating oral fluid: water. Declines other fluids or food at this time. TDO hearing scheduled for Monday at 0730. Pt made aware of TDO process. Increased verbal engagement assisting nurse to fill out lunch and dinner menu for today. NAD.

## 2021-07-11 NOTE — BSMART NOTE
Pt is a 40 y/o male known to [facility] from two prior assessments within the past 30 hours. Documentation for both assessments was quite comprehensive and is included below. Pt was brought to the ED yesterday for a syncopal episode in which he fell in his hotel bathroom. BSMART saw him because his wife expressed concerns about pt's behavior. Pt reportedly has no known history of severe mental illness and family describes him as \"normal\" until approximately 3 months ago, at which time he began to exhibit unusual behavior, such as talking to himself, increasing agitation and irritability, isolation, mood instability with episodes of crying. \" Pt was involuntarily hospitalized at a psychiatric facility in [place] (where he and wife reside) 3 weeks ago. He reportedly was discharged on several psych meds but only agreed to take Trazodone and Clonazepam (family did not identify the other meds he was prescribed). Pt and wife were in [address] [place] [address] visiting family this week \"but symptoms reportedly got worse, with patient locking himself in the bathroom for hours, staring at himself in the mirror, crying for hours for no clear reason, significant decrease in appetite, last night that he didn't sleep at all, was crying all night but not able to explain why. \" Wife reportedly was driving pt back home to [place] but yesterday stopped to stay in a hotel in [place]. While in the hotel room, pt reportedly was \"repeatedly looking under the mattress and in the closet in their hotel room, then went into the bathroom, locking himself in and eventually falling, prompting wife to call EMS. \"  During first BSMART interview at approximately 4pm yesterday, pt was noted to be distracted, confused, disoriented and demonstrated clear evidence of thought blocking. The [facility] clinician felt pt lacked capacity to consent to treatment. TDO was recommended but pt eloped before Andrea LANIER could prescreen pt.  He returned to the ED a second time last night, escorted by [employer]. He spoke with a second BSMART clinician and was amenable to hospitalization at that time, but when he was transferred to the psych unit he refused to sign admission paperwork and was sent back to the ED. He was prescreened by [facility] but they did not recommend a TDO at that time. Pt was discharged with plan to return to his hotel room with his wife, who would arrange to have pt's mother meet them long term and take pt back to [place] where she could monitor him more closely. Pt returned to the ED [facility], transported by EMS. Apparently a passerby called 911 because pt was observed lying on the ground of the hotel parking lot. Pt told the ED physician he \"jeannine\" fell and hit his head. Unclear when this occurred, if at all. CTs of the head and spine were taken and both were WNL. Writer met with pt f/f at bedside. He presents with flat, slow speech, poor eye contact, psychomotor retardation, thought blocking, delayed responses that are grossly incoherent. Pt is disoriented to situation, unable to tell writer what precipitated his arrival at the hospital [facility]. He did not recall falling in the parking lot but unable to tell writer how he got there, how long he had been there or where he was prior. Pt refused when writer asked if I could call his family or his wife. He stated he and his wife are no longer together \"because of just a lot of stuff I can't talk about now. \" He stated his intent was to [facility] here and go to [place]. \" When asked how he would get there, pt stated, \"I don't know. I''ll figure it out later. I just need time. \" Pt then closed his eyes and stopped responding to writer altogether. Writer returned to pt's room several times and each time, pt was entirely unresponsive, staring directly in front of him, blinking rapidly, clearly responding to internal stimuli. Nursing staff attempted to obtain a urine specimen from pt several times.  He was mostly unresponsive, but a few times he yelled at them that his name was not \"Luis Angel\" and that it is actually \"Butler. \" He insisted that they address him as such. Pt's mother called the ED and asked to speak with pt's nurse, but the call was transferred to [facility] instead. Mother reported that pt was supposed to go home with his wife last night after discharge from the hospital, but pt refused to go with her and they argued outside, prompting police to become involved. Police reportedly escorted pt back to his hotel since he would not go with his wife, but it is unclear where pt went from there or if he ever checked in. Mother expressed concern for pt's safety and feels he should be hospitalized. She reports he has not been sleeping or eating and she is uncertain of the last time he did so. 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 Pt's mother clarified that \"Butler\" is the name of pt's  stepfather who  over a decade ago, and pt has never asked anyone to call him by that name. Writer spoke with [facility] at [facility]. She agreed with recommendation for TDO but pt had yet to provide a urine specimen thus he had not yet been medically cleared. Pt did finally provide a specimen this morning, at which time pt's mother had arrived from [place]. Pt was asked several times to engage with mother in hopes of establishing some sort of safety plan, but he continues to be unresponsive to all attempts at engagement due to severe thought blocking and internal preoccupation. Writer apprised Andrea LANIER that pt is now medically cleared and a bed is available.

## 2021-07-11 NOTE — ED PROVIDER NOTES
HPI the patient has a history of schizophrenia and he was admitted last evening to the psychiatric service. However he refused to sign the appropriate forms at the time of admission and was signed out AMA. He is now brought in by EMS after having been found lying in the [place]. The patient says he \"jeannine\" fell in the parking lot and hit his head. He admits to having a mild headache and neck pain. He denies LOC. He denies SI/HI. He denies alcohol or drug use. He denies hallucinations. Past Medical History:   Diagnosis Date    Schizo-affective schizophrenia [facility])        Past Surgical History:   Procedure Laterality Date    HX ORTHOPAEDIC      Right Leg         No family history on file. Social History     Socioeconomic History    Marital status:      Spouse name: Not on file    Number of children: Not on file    Years of education: Not on file    Highest education level: Not on file   Occupational History    Not on file   Tobacco Use    Smoking status: Current Some Day Smoker    Smokeless tobacco: Never Used   Substance and Sexual Activity    Alcohol use: Not Currently    Drug use: Not Currently    Sexual activity: Not on file   Other Topics Concern    Not on file   Social History Narrative    Not on file     Social Determinants of Health     Financial Resource Strain:     Difficulty of Paying Living Expenses:    Food Insecurity:     Worried About Running Out of Food in the Last Year:     [address] in the Last Year:    Transportation Needs:     Lack of Transportation (Medical):      Lack of Transportation (Non-Medical):    Physical Activity:     Days of Exercise per Week:     Minutes of Exercise per Session:    Stress:     Feeling of Stress :    Social Connections:     Frequency of Communication with Friends and Family:     Frequency of Social Gatherings with Friends and Family:     Attends Catholic Services:     Active Member of Clubs or Organizations:     Attends Club or Organization Meetings:     Marital Status:    Intimate Partner Violence:     Fear of Current or Ex-Partner:     Emotionally Abused:     Physically Abused:     Sexually Abused: ALLERGIES: Patient has no known allergies. Review of Systems   Constitutional: Negative for fever. HENT: Negative for voice change. Eyes: Negative for pain. Respiratory: Negative for cough and shortness of breath. Cardiovascular: Negative for chest pain. Gastrointestinal: Negative for abdominal pain, nausea and vomiting. Genitourinary: Negative for flank pain. Musculoskeletal: Positive for neck pain. Negative for back pain. Skin: Negative for rash. Neurological: Positive for headaches. Psychiatric/Behavioral: Positive for behavioral problems and confusion. There were no vitals filed for this visit. Physical Exam  Constitutional:       General: He is not in acute distress. Appearance: He is well-developed. HENT:      Head: Normocephalic and atraumatic. Eyes:      Pupils: Pupils are equal, round, and reactive to light. Cardiovascular:      Rate and Rhythm: Normal rate. Heart sounds: No murmur heard. Pulmonary:      Effort: Pulmonary effort is normal.      Breath sounds: Normal breath sounds. Abdominal:      Palpations: Abdomen is soft. Tenderness: There is no abdominal tenderness. Musculoskeletal:         General: Normal range of motion. Cervical back: Normal range of motion. No tenderness. Skin:     General: Skin is warm and dry. Capillary Refill: Capillary refill takes less than 2 seconds. Neurological:      Mental Status: He is alert and oriented to person, place, and time. Motor: No weakness. Psychiatric:      Comments: The patient is awake and answers questions but is slow to respond.           MDM       Procedures

## 2021-07-11 NOTE — BH NOTES
TRANSFER - IN REPORT:    Verbal report received from Rox APONTE(name) on Hermilo Kohli  being received from [address] [place] [address] ED(unit) for routine progression of care      Report consisted of patients Situation, Background, Assessment and   Recommendations(SBAR). Information from the following report(s) SBAR was reviewed with the receiving nurse. Opportunity for questions and clarification was provided. Assessment completed upon patients arrival to unit and care assumed. TDO Andrea.   Pt arrives mumbling. Preferred name \"Luis Angel\". 2 person assist to bed and chair. \" simple instructions required. VS stable.      Primary Nurse Lew Benedict and Marco Leon RN, RN performed a dual skin assessment on this patient No impairment noted  Marquis score is 23

## 2021-07-11 NOTE — PROGRESS NOTES
Problem: Anxiety-Behavioral Health (Adult/Pediatric)  Goal: *STG: Remains safe in hospital  7/11/2021 1753 by Anjelica Segovia  Outcome: Progressing Towards Goal  Problem: Anxiety-Behavioral Health (Adult/Pediatric)  Goal: Interventions  7/11/2021 1753 by Anjelica Segovia  Outcome: Progressing Towards Goal  Note: Pt is meal compliant. Showered. Ambulating with a steady gate. Pt is guarded. Refuses to sign SELIN. Stating there is no support person he wants involved in his care or his discharge (no spouse or parent per pt). \"Me. I want to be released to myself. \"   Pt denies prior hospitalization or TDO experience. Denies mental health history or substance use (drugs or alcohol). States he was given an anti anxiety medication a long time ago. \"I only took one. I didn't like the way it made me feel. \"   Pt denies AVH. Denies SI or HI. Pt declines education related to the TDO process. Treatment team process explained. opportunity for questions and clarification provided. Pt declines questions.

## 2021-07-12 LAB
CHOLEST SERPL-MCNC: 205 MG/DL
GLUCOSE P FAST SERPL-MCNC: 97 MG/DL (ref 65–100)
HDLC SERPL-MCNC: 54 MG/DL
HDLC SERPL: 3.8 {RATIO} (ref 0–5)
LDLC SERPL CALC-MCNC: 132.2 MG/DL (ref 0–100)
TRIGL SERPL-MCNC: 94 MG/DL (ref ?–150)
VLDLC SERPL CALC-MCNC: 18.8 MG/DL

## 2021-07-12 PROCEDURE — 82947 ASSAY GLUCOSE BLOOD QUANT: CPT

## 2021-07-12 PROCEDURE — 80061 LIPID PANEL: CPT

## 2021-07-12 PROCEDURE — 65220000003 HC RM SEMIPRIVATE PSYCH

## 2021-07-12 PROCEDURE — 36415 COLL VENOUS BLD VENIPUNCTURE: CPT

## 2021-07-12 PROCEDURE — 74011250637 HC RX REV CODE- 250/637: Performed by: NURSE PRACTITIONER

## 2021-07-12 RX ORDER — RISPERIDONE 1 MG/ML
1 SOLUTION ORAL 2 TIMES DAILY
Status: DISCONTINUED | OUTPATIENT
Start: 2021-07-12 | End: 2021-07-12

## 2021-07-12 RX ORDER — RISPERIDONE 1 MG/ML
1 SOLUTION ORAL 2 TIMES DAILY
Status: DISCONTINUED | OUTPATIENT
Start: 2021-07-12 | End: 2021-07-15 | Stop reason: HOSPADM

## 2021-07-12 RX ORDER — RISPERIDONE 1 MG/1
1 TABLET, FILM COATED ORAL 2 TIMES DAILY
Status: DISCONTINUED | OUTPATIENT
Start: 2021-07-12 | End: 2021-07-12

## 2021-07-12 RX ADMIN — RISPERIDONE 1 MG: 1 TABLET ORAL at 12:20

## 2021-07-12 RX ADMIN — TRAZODONE HYDROCHLORIDE 50 MG: 50 TABLET ORAL at 20:34

## 2021-07-12 RX ADMIN — Medication 1 MG: at 14:41

## 2021-07-12 RX ADMIN — Medication 1 MG: at 18:30

## 2021-07-12 NOTE — PROGRESS NOTES
Problem: Altered Thought Process (Adult/Pediatric)  Goal: *STG: Participates in treatment plan  Outcome: Progressing Towards Goal  Goal: *STG: Seeks staff when feelings of anxiety and fear arise  Outcome: Progressing Towards Goal  Goal: *STG: Attends activities and groups  Outcome: Progressing Towards Goal     Problem: Patient Education: Go to Patient Education Activity  Goal: Patient/Family Education  Outcome: Progressing Towards Goal     Ward Mckeon Leslie  Master Treatment Plan for Jnue Barnes    Date Treatment Plan Initiated: 07/12/2021    Treatment Plan Modalities:  Type of Modality Amount  (x minutes) Frequency (x/week) Duration (x days) Name of Responsible Staff   Community & wrap-up meetings to encourage peer interactions 15 7 1 RUSH Adams     Group psychotherapy to assist in building coping skills and internal controls 60 7 1 Donita Gamez   Therapeutic activity groups to build coping skills 60 7 1 Donita Gamez   Psychoeducation in group setting to address:   Medication education   [facility] skills   30 3 1 [address] [facility]   Symptom management      Hien Sheppard RN   Discharge planning   60 2 Will Chambers 3 2 1 Chaplain FAUSTIN   61 1 1 volunteer   Recovery/AA/NA      volunteer   Physician medication management   15 7 1    Family meeting/discharge planning   15 2 [facility] and [place]

## 2021-07-12 NOTE — PROGRESS NOTES
Problem: Altered Thought Process (Adult/Pediatric)  Goal: Interventions  Outcome: Progressing Towards Goal     Problem: Patient Education: Go to Patient Education Activity  Goal: Patient/Family Education  Outcome: Progressing Towards Goal     Problem: Patient Education: Go to Patient Education Activity  Goal: Patient/Family Education  Outcome: Progressing Towards Goal     1515: pt received resting in bed, cooperative, flat affect. Will continue Q15 safety checks.

## 2021-07-12 NOTE — PROGRESS NOTES
Problem: Falls - Risk of  Goal: *Absence of Falls  Description: Document Karen Edward Fall Risk and appropriate interventions in the flowsheet. Outcome: Progressing Towards Goal  Note: Fall Risk Interventions:          Resting in bed with eyes closed, no complaints, no distress noted. Safety measures in place, will continue to monitor.

## 2021-07-12 NOTE — BH NOTES
Per pt, he cannot tolerate swallowing pills.   Per Avinash Smith, NP change risperidone tablet to oral solution 1mg BID

## 2021-07-12 NOTE — BH NOTES
PSYCHOSOCIAL ASSESSMENT  :Patient identifying info:   Severiano Sides is a 39 y.o., male admitted 7/10/2021  8:35 PM     Presenting problem and precipitating factors: Pt was admitted to [facility] under [place] TDO for bizarre behaviors. Pt came to ED 2x in 5 hours voluntarily then left AMA each time, refusing voluntarily admitted. EMS brought him to ED 3rd time after he was found unconscious in parking lot of hotel. Wife reported bizarre, disorganized and paranoid behaviors. Pt was hopsitalized 3 weeks ago and diagnosed with schizophrenia. He denies his Dx and refuses to take medications prescribed to him. He was involuntarily committed at his hearing and was forthcoming with  about his MH history but continues to deny it with [facility] staff and Tx team.     Mental status assessment: alert, oriented, calm, aloof, limited insight, poor judgement, minimizes MH symptoms, denies MH history and hospitalizations    Strengths: stable housing, supportive wife    Collateral information: wife, Elmira Robison ([phone]) SELIN signed  MS left message for wife 7/12/21 at [phone]. Current psychiatric /substance abuse providers and contact info: none indicated    Previous psychiatric/substance abuse providers and response to treatment: yes, 4 days in June 2021 on [facility] in [place] (prescribed mood stabilizer and antipsychotics)     Family history of mental illness or substance abuse: maternal aunt has bipolar, grandmother had mental breakdown    Substance abuse history:    Social History     Tobacco Use    Smoking status: Current Some Day Smoker    Smokeless tobacco: Never Used   Substance Use Topics    Alcohol use: Not Currently       History of biomedical complications associated with substance abuse : none indicated    Patient's current acceptance of treatment or motivation for change: TDO - involuntarily committed - doesn't think he needs medications     Family constellation: wife, mom    Is significant other involved? Yes, wife is supportive    Describe support system: wife    Describe living arrangements and home environment: lives with wife in [facility] Problems  Never Reviewed        Codes Class Noted POA    Schizoaffective disorder [facility]) ICD-10-CM: F25.9  ICD-9-CM: 295.70  7/10/2021 Unknown              Trauma history: none indicated    Legal issues: none indicated    History of  service: no    Financial status: employed, works in [employer]    Yazidi/cultural factors: none indicated    Education/work history: not assessed    Have you been licensed as a health care professional (current or ): no    Leisure and recreation preferences: not assessed    Describe coping skills: limited, ineffective    Almaz Whelan  2021

## 2021-07-12 NOTE — PROGRESS NOTES
Admission Medication Reconciliation:    Information obtained from:  patient interview and [facility]  RxQuery data available¹:  YES    Comments/Recommendations: Updated PTA meds/reviewed patient's allergies. 1)  The patient denies taking medications prior to admission. He also denies medication allergies. Per chart review, it appears that he was recently hospitalized in [place] and possibly discharged on clonazepam and trazodone. 2)  The [place] Prescription Monitoring Program () was assessed to determine fill history of any controlled medications. The patient has not filled any controlled medications in [place] but he filled clonazepam 0.5mg in [place] on 6/25/21 (#7 for 7 day supply). The patient reports that he has not been taking it. 3)  Medication changes (since last review):  Removed  - clonazepam and trazodone   ¹RxQuery pharmacy benefit data reflects medications filled and processed through the patient's insurance, however this data does NOT capture whether the medication was picked up or is currently being taken by the patient. Allergies:  Patient has no known allergies.     Significant PMH/Disease States:   Past Medical History:   Diagnosis Date    Schizo-affective schizophrenia [facility])        Chief Complaint for this Admission:    Chief Complaint   Patient presents with    Mental Health Problem    Fall     Prior to Admission Medications:   None     ARVIND Nunez

## 2021-07-12 NOTE — INTERDISCIPLINARY ROUNDS
PRN Medication Documentation    Specific patient behavior that led to need for PRN medication: HA and sleeplessness  Staff interventions attempted prior to PRN being given: education  PRN medication given: Tylenol and Trazodone  Patient response/effectiveness of PRN medication: will continue to monitor

## 2021-07-12 NOTE — BH NOTES
GROUP THERAPY PROGRESS NOTE    Patient is participating in psychosocial assessment group. Group time: 50 minutes    Personal goal for participation: To complete a psychosocial assessment     Goal orientation: Personal    Group therapy participation: active     Therapeutic interventions reviewed and discussed: Social workers completed psychosocial assessment on patients upon admission to the unit. The goal is to gather vital information about a patient in order to best meet their needs during their stay here. Impression of participation: Patient participated in assessment and provided psychosocial history.    LEONARDO Amaya, Supervisee in Social Work

## 2021-07-12 NOTE — H&P
[address] [facility] HISTORY AND PHYSICAL    Name:  Curly Ray  MR#:  [id number]  :  1985  ACCOUNT #:  [de-identified]  ADMIT DATE:  07/10/2021    INITIAL PSYCHIATRIC EVALUATION    DATE OF SERVICE:  2021    CHIEF COMPLAINT:  \"I passed out. \"    HISTORY OF PRESENT ILLNESS:  The patient is a 70-year-old male who was initially admitted here at [address] on a temporary skilled nursing order. He got involuntary committed from his hearing this morning. He is a not a reliable informant. He states that he was in a hotel. He got very lightheaded, passed out, maybe lost consciousness. He denies suicidal ideation or homicidal ideation. Denies paranoia or hallucination. He states that he has never been admitted in any psychiatric facility. He also tells me that he has no recollection of any incident the last few days that ultimately led to his admission here at [facility]. When he initially was admitted here at [facility] over the weekend, he refused to sign the admission paperwork, and so he was transferred back to the ED. He was then evaluated by [facility] and did not meet TDO criteria, and so he was discharged back to his hotel with his wife, but then he came back to the emergency room that same night via EMS. Apparently, a passerby called 911 because the patient was lying on the ground at the hotel parking lot. His wife had expressed concerns about his behavior, adding that he was recently diagnosed with schizophrenia. His mother also had been calling the unit and stated that the patient was admitted in a psychiatric facility in [place] weeks ago for a 4-day inpatient stay and was diagnosed with schizoaffective disorder. The wife reported on admission that the patient was \"normal\" until about 3 months ago when he started exhibiting unusual behavior. He is talking to himself.   He has been increasingly agitated, irritable, isolating and has episodes of crying. He reported that he was \"Baker Acted\" in [place] that led to his admission at [facility]. The patient is originally from [place]. They went to [place] for vacation and were driving back to [place] when they had to stop here in [place] because of his bizarre behaviors. Reportedly, his symptoms have been increasingly getting worse. He was been locking himself in the bathroom for hours, staring at himself in the mirror, crying for hours. He also has not been sleeping. There is no substance use history. There is some thought blocking noted. He appears confused, disoriented. From my understanding, his wife and his mom are on their way here to [place]. He is admitted to the inpatient psychiatric unit for further evaluation and treatment. PAST MEDICAL HISTORY:  See H and P.     Past Medical History:   Diagnosis Date    Schizo-affective schizophrenia ([facility] [address]        Labs: (reviewed/updated 7/13/2021)  Patient Vitals for the past 8 hrs:   BP Temp Pulse Resp   07/13/21 0807 109/72 98.1 °F (36.7 °C) 96 16     Labs Reviewed   METABOLIC PANEL, COMPREHENSIVE - Abnormal; Notable for the following components:       Result Value    AST (SGOT) 13 (*)     All other components within normal limits   URINALYSIS W/MICROSCOPIC - Abnormal; Notable for the following components:    Ketone 80 (*)     All other components within normal limits   LIPID PANEL - Abnormal; Notable for the following components:    Cholesterol, total 205 (*)     LDL, calculated 132.2 (*)     All other components within normal limits   ETHYL ALCOHOL   CBC WITH AUTOMATED DIFF   CK W/ REFLX CKMB   DRUG SCREEN, URINE   COVID-19 WITH INFLUENZA A/B   GLUCOSE, FASTING     Lab Results   Component Value Date/Time    Sodium 136 07/10/2021 09:12 PM    Potassium 3.6 07/10/2021 09:12 PM    Chloride 104 07/10/2021 09:12 PM    CO2 25 07/10/2021 09:12 PM    Anion gap 7 07/10/2021 09:12 PM    Glucose 97 07/12/2021 06:17 AM    BUN 14 07/10/2021 09:12 PM    Creatinine 0.75 07/10/2021 09:12 PM    BUN/Creatinine ratio 19 07/10/2021 09:12 PM    GFR est AA >60 07/10/2021 09:12 PM    GFR est non-AA >60 07/10/2021 09:12 PM    Calcium 9.7 07/10/2021 09:12 PM    Bilirubin, total 0.9 07/10/2021 09:12 PM    Alk. phosphatase 88 07/10/2021 09:12 PM    Protein, total 7.5 07/10/2021 09:12 PM    Albumin 3.9 07/10/2021 09:12 PM    Globulin 3.6 07/10/2021 09:12 PM    A-G Ratio 1.1 07/10/2021 09:12 PM    ALT (SGPT) 23 07/10/2021 09:12 PM     Admission on 07/10/2021   Component Date Value Ref Range Status    ALCOHOL(ETHYL),SERUM 07/10/2021 <10  <10 MG/DL Final    WBC 07/10/2021 8.0  4.1 - 11.1 K/uL Final    RBC 07/10/2021 5.00  4. 10 - 5.70 M/uL Final    HGB 07/10/2021 15.0  12.1 - 17.0 g/dL Final    HCT 07/10/2021 44.4  36.6 - 50.3 % Final    MCV 07/10/2021 88.8  80.0 - 99.0 FL Final    MCH 07/10/2021 30.0  26.0 - 34.0 PG Final    MCHC 07/10/2021 33.8  30.0 - 36.5 g/dL Final    RDW 07/10/2021 12.9  11.5 - 14.5 % Final    PLATELET 39/90/6187 406  150 - 400 K/uL Final    MPV 07/10/2021 11.0  8.9 - 12.9 FL Final    NRBC 07/10/2021 0.0  0  WBC Final    ABSOLUTE NRBC 07/10/2021 0.00  0.00 - 0.01 K/uL Final    NEUTROPHILS 07/10/2021 65  32 - 75 % Final    LYMPHOCYTES 07/10/2021 27  12 - 49 % Final    MONOCYTES 07/10/2021 7  5 - 13 % Final    EOSINOPHILS 07/10/2021 1  0 - 7 % Final    BASOPHILS 07/10/2021 0  0 - 1 % Final    IMMATURE GRANULOCYTES 07/10/2021 0  0.0 - 0.5 % Final    ABS. NEUTROPHILS 07/10/2021 5.2  1.8 - 8.0 K/UL Final    ABS. LYMPHOCYTES 07/10/2021 2.1  0.8 - 3.5 K/UL Final    ABS. MONOCYTES 07/10/2021 0.5  0.0 - 1.0 K/UL Final    ABS. EOSINOPHILS 07/10/2021 0.1  0.0 - 0.4 K/UL Final    ABS. BASOPHILS 07/10/2021 0.0  0.0 - 0.1 K/UL Final    ABS. IMM.  GRANS. 07/10/2021 0.0  0.00 - 0.04 K/UL Final    DF 07/10/2021 AUTOMATED    Final    CK 07/10/2021 72  39 - 308 U/L Final    Sodium 07/10/2021 136  136 - 145 mmol/L Final  Potassium 07/10/2021 3.6  3.5 - 5.1 mmol/L Final    Chloride 07/10/2021 104  97 - 108 mmol/L Final    CO2 07/10/2021 25  21 - 32 mmol/L Final    Anion gap 07/10/2021 7  5 - 15 mmol/L Final    Glucose 07/10/2021 88  65 - 100 mg/dL Final    BUN 07/10/2021 14  6 - 20 MG/DL Final    Creatinine 07/10/2021 0.75  0.70 - 1.30 MG/DL Final    BUN/Creatinine ratio 07/10/2021 19  12 - 20   Final    GFR est AA 07/10/2021 >60  >60 ml/min/1.73m2 Final    GFR est non-AA 07/10/2021 >60  >60 ml/min/1.73m2 Final    Calcium 07/10/2021 9.7  8.5 - 10.1 MG/DL Final    Bilirubin, total 07/10/2021 0.9  0.2 - 1.0 MG/DL Final    ALT (SGPT) 07/10/2021 23  12 - 78 U/L Final    AST (SGOT) 07/10/2021 13* 15 - 37 U/L Final    Alk.  phosphatase 07/10/2021 88  45 - 117 U/L Final    Protein, total 07/10/2021 7.5  6.4 - 8.2 g/dL Final    Albumin 07/10/2021 3.9  3.5 - 5.0 g/dL Final    Globulin 07/10/2021 3.6  2.0 - 4.0 g/dL Final    A-G Ratio 07/10/2021 1.1  1.1 - 2.2   Final    AMPHETAMINES 07/11/2021 Negative  NEG   Final    BARBITURATES 07/11/2021 Negative  NEG   Final    BENZODIAZEPINES 07/11/2021 Negative  NEG   Final    COCAINE 07/11/2021 Negative  NEG   Final    METHADONE 07/11/2021 Negative  NEG   Final    OPIATES 07/11/2021 Negative  NEG   Final    PCP(PHENCYCLIDINE) 07/11/2021 Negative  NEG   Final    THC (TH-CANNABINOL) 07/11/2021 Negative  NEG   Final    Drug screen comment 07/11/2021 (NOTE)   Final    SARS-CoV-2 07/11/2021 Not detected  NOTD   Final    Influenza A by PCR 07/11/2021 Not detected  NOTD   Final    Influenza B by PCR 07/11/2021 Not detected  NOTD   Final    Color 07/11/2021 YELLOW/STRAW    Final    Appearance 07/11/2021 CLEAR  CLEAR   Final    Specific gravity 07/11/2021 1.028  1.003 - 1.030   Final    pH (UA) 07/11/2021 6.0  5.0 - 8.0   Final    Protein 07/11/2021 Negative  NEG mg/dL Final    Glucose 07/11/2021 Negative  NEG mg/dL Final    Ketone 07/11/2021 80* NEG mg/dL Final    Bilirubin 07/11/2021 Negative  NEG   Final    Blood 07/11/2021 Negative  NEG   Final    Urobilinogen 07/11/2021 1.0  0.2 - 1.0 EU/dL Final    Nitrites 07/11/2021 Negative  NEG   Final    Leukocyte Esterase 07/11/2021 Negative  NEG   Final    WBC 07/11/2021 0-4  0 - 4 /hpf Final    RBC 07/11/2021 0-5  0 - 5 /hpf Final    Epithelial cells 07/11/2021 FEW  FEW /lpf Final    Bacteria 07/11/2021 Negative  NEG /hpf Final    Hyaline cast 07/11/2021 2-5  0 - 5 /lpf Final    Glucose 07/12/2021 97  65 - 100 MG/DL Final    Cholesterol, total 07/12/2021 205* <200 MG/DL Final    Triglyceride 07/12/2021 94  <150 MG/DL Final    HDL Cholesterol 07/12/2021 54  MG/DL Final    LDL, calculated 07/12/2021 132.2* 0 - 100 MG/DL Final    VLDL, calculated 07/12/2021 18.8  MG/DL Final    CHOL/HDL Ratio 07/12/2021 3.8  0.0 - 5.0   Final     Vitals:    07/12/21 0802 07/12/21 1620 07/12/21 1940 07/13/21 0807   BP: 117/74 102/66 104/73 109/72   Pulse: 76 78 87 96   Resp: 16 16 16 16   Temp: 97.8 °F (36.6 °C) 98 °F (36.7 °C) 97.5 °F (36.4 °C) 98.1 °F (36.7 °C)   SpO2: 98% 97% 99%    Weight:       Height:         No results found for this or any previous visit (from the past 24 hour(s)). RADIOLOGY REPORTS:  No results found for this or any previous visit. CT HEAD WO CONT    Result Date: 7/10/2021  EXAM: CT HEAD WO CONT INDICATION: Head injury. COMPARISON: None. CONTRAST: None. TECHNIQUE: Unenhanced CT of the head was performed using 5 mm images. Brain and bone windows were generated. Coronal and sagittal reformats. CT dose reduction was achieved through use of a standardized protocol tailored for this examination and automatic exposure control for dose modulation. FINDINGS: The ventricles and sulci are normal in size, shape and configuration. . There is no significant white matter disease. There is no intracranial hemorrhage, extra-axial collection, or mass effect. The basilar cisterns are open. No CT evidence of acute infarct. The bone windows demonstrate no abnormalities. The visualized portions of the paranasal sinuses and mastoid air cells are clear. No acute findings. CT SPINE CERV WO CONT    Result Date: 7/10/2021  INDICATION: fall, neck pain. Exam: Noncontrast CT of the cervical spine is performed with 2.5 mm collimation. Sagittal and coronal reformatted images were also performed. CT dose reduction was achieved through use of a standardized protocol tailored for this examination and automatic exposure control for dose modulation. FINDINGS: There is no acute fracture or subluxation. The prevertebral soft tissues are within normal limits. Bones are well mineralized. Remaining visualized soft tissues are normal.     No acute fracture or subluxation. PAST PSYCHIATRIC HISTORY:  The patient had no prior history until about 3 months ago when he started acting bizarre that led to his admission in a psychiatric facility in [place] 4 weeks ago, and he stayed for 4 days. Unfortunately, his family could not remember what medications he was on. He has been diagnosed with schizophrenia versus schizoaffective disorder per his family. PSYCHOSOCIAL HISTORY:  He has been  for a little over a year. He has no children. 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 He is originally from [place]. MENTAL STATUS EXAM:  The patient is sitting in chair, dressed in street clothes. He reports his mood is okay. Affect is constricted. Speech:  Soft, incoherent. He denies suicidal ideation, homicidal ideation, or auditory or visual hallucination, but thought blocking is noted. Paranoia is noted. Cognitively, he is awake and alert, but a formal memory testing was not completed. Insight is poor. Judgment is poor. DIAGNOSIS:  Unspecified psychotic disorder, ruled out schizophreniform disorder. TREATMENT PLAN:  I will continue his inpatient stay.   He will be provided with support and encouraged to attend groups. His safety will be monitored. His medications will be modified and assessed. Case Management will work on discharge planning. ASSETS AND STRENGTHS:  He has a supportive family, he has stable housing. ESTIMATED LENGTH OF STAY:  5-7 days.       FRANCISCO HANNON NP      [id number]  D:  07/12/2021 16:48  T:  07/12/2021 18:33  JOB #:  [id number]

## 2021-07-12 NOTE — INTERDISCIPLINARY ROUNDS
Behavioral Health Interdisciplinary Rounds     Patient Name: Hermilo Kohli  Age: 39 y.o. Room/Bed:  728/02  Primary Diagnosis: <principal problem not specified>   Admission Status: TDO     Readmission within 30 days: no  Power of  in place: no  Patient requires a blocked bed: no          Reason for blocked bed:     VTE Prophylaxis: No    Mobility needs/Fall risk: no  Flu Vaccine :   Nutritional Plan: no  Consults:          Labs/Testing due today?: yes    Sleep hours:  7      Participation in Care/Groups:    Medication Compliant?: New admit  PRNS (last 24 hours): Sleep Aid and Pain    Restraints (last 24 hours):  no     CIWA (range last 24 hours):     COWS (range last 24 hours):      Alcohol screening (AUDIT) completed -         If applicable, date SBIRT discussed in treatment team AND documented:   AUDIT Screen Score: AUDIT Score: 0    Tobacco - patient is a smoker: Have You Used Tobacco in the Past 30 Days: No  Illegal Drugs use: Have You Used Any Illegal Substances Over the Past 12 Months: No    24 hour chart check complete: yes     Patient goal(s) for today: meet treatment team, acclimate to unit  Treatment team focus/goals: assess needs for treatment and safe discharge,   Progress note:  Pt was admitted under TDO and involuntarily committed. Pt was alert, oriented, calm and engaged. He denies any history of IP Tx or MH Dx. He admits to being medication noncompliant but agrees to take medications while he is here. MSW left message with Pt's wife, Charlotte Casiano ([phone])    LOS:  1  Expected LOS: TBD    Financial concerns/prescription coverage:  TDO  Family contact: wife, Charlotte Casiano ([phone]) SELIN signed.   Family requesting physician contact today:  no  Discharge plan: return home with wife  Access to weapons : na   Outpatient provider(s): TBD  Patient's preferred phone number for follow up call : [phone]   Patient's preferred e-mail address :    Participating treatment team members: Maria M Lucio MSW; Randall Mckenzie, PAULA; Jaron Lindsey, RN; Mary Ann Velasquez, PharmD

## 2021-07-12 NOTE — BH NOTES
GROUP THERAPY PROGRESS NOTE    Patient is participating in treatment team group. Group time: 60 minutes    Personal goal for participation: To participate in treatment plan and discharge     Goal orientation: Personal    Group therapy participation: active     Therapeutic interventions reviewed and discussed: Group members met with their treatment team individually and discussed their treatment plan with their provider, , nurse, and pharmacist. Each patient was given the opportunity to ask questions related to their discharge and/or medications. Impression of participation: Patient participated in treatment team. Engaged in conversation and discussion. Asked questions about treatment plan and discharge.     LEONARDO Lombardo, Supervisee in Social Work

## 2021-07-13 PROCEDURE — 74011250637 HC RX REV CODE- 250/637: Performed by: NURSE PRACTITIONER

## 2021-07-13 PROCEDURE — 65220000003 HC RM SEMIPRIVATE PSYCH

## 2021-07-13 RX ADMIN — Medication 1 MG: at 08:44

## 2021-07-13 RX ADMIN — TRAZODONE HYDROCHLORIDE 50 MG: 50 TABLET ORAL at 20:12

## 2021-07-13 RX ADMIN — Medication 1 MG: at 18:12

## 2021-07-13 NOTE — PROGRESS NOTES
Problem: Falls - Risk of  Goal: *Absence of Falls  Description: Document Parvin Terry Fall Risk and appropriate interventions in the flowsheet. Outcome: Progressing Towards Goal  Note: Fall Risk Interventions:            Medication Interventions: Teach patient to arise slowly       Patient received, appears to be asleep, eyes closed, breathing even and unlabored. No signs of distress noted. Will continue to monitor for safety.

## 2021-07-13 NOTE — PROGRESS NOTES
Laboratory Monitoring for Antipsychotics: This patient is currently prescribed the following medication(s):   Current Facility-Administered Medications   Medication Dose Route Frequency    risperiDONE (RisperDAL) 1 mg/mL oral solution soln 1 mg  1 mg Oral BID     The following labs have been completed for monitoring of antipsychotics and/or mood stabilizers:    Height, Weight, BMI Estimation  Estimated body mass index is 28.25 kg/m² as calculated from the following:    Height as of this encounter: 167.6 cm (66\"). Weight as of this encounter: 79.4 kg (175 lb). Vital Signs/Blood Pressure  Visit Vitals  /72 (BP 1 Location: Left upper arm, BP Patient Position: Sitting)   Pulse 96   Temp 98.1 °F (36.7 °C)   Resp 16   Ht 167.6 cm (66\")   Wt 79.4 kg (175 lb)   SpO2 99%   BMI 28.25 kg/m²     Renal Function, Hepatic Function and Chemistry  Estimated Creatinine Clearance: 133.5 mL/min (by C-G formula based on SCr of 0.75 mg/dL). Lab Results   Component Value Date/Time    Sodium 136 07/10/2021 09:12 PM    Potassium 3.6 07/10/2021 09:12 PM    Chloride 104 07/10/2021 09:12 PM    CO2 25 07/10/2021 09:12 PM    Anion gap 7 07/10/2021 09:12 PM    BUN 14 07/10/2021 09:12 PM    Creatinine 0.75 07/10/2021 09:12 PM    BUN/Creatinine ratio 19 07/10/2021 09:12 PM    Bilirubin, total 0.9 07/10/2021 09:12 PM    Protein, total 7.5 07/10/2021 09:12 PM    Albumin 3.9 07/10/2021 09:12 PM    Globulin 3.6 07/10/2021 09:12 PM    A-G Ratio 1.1 07/10/2021 09:12 PM    ALT (SGPT) 23 07/10/2021 09:12 PM    AST (SGOT) 13 (L) 07/10/2021 09:12 PM    Alk.  phosphatase 88 07/10/2021 09:12 PM     Lab Results   Component Value Date/Time    Glucose 97 07/12/2021 06:17 AM     No results found for: HBA1C, DCK2AWVF    Hematology  Lab Results   Component Value Date/Time    WBC 8.0 07/10/2021 09:12 PM    RBC 5.00 07/10/2021 09:12 PM    HGB 15.0 07/10/2021 09:12 PM    HCT 44.4 07/10/2021 09:12 PM    MCV 88.8 07/10/2021 09:12 PM    MCH 30.0 07/10/2021 09:12 PM    MCHC 33.8 07/10/2021 09:12 PM    RDW 12.9 07/10/2021 09:12 PM    PLATELET 645 70/70/0562 09:12 PM     Lipids  Lab Results   Component Value Date/Time    Cholesterol, total 205 (H) 07/12/2021 06:17 AM    HDL Cholesterol 54 07/12/2021 06:17 AM    LDL, calculated 132.2 (H) 07/12/2021 06:17 AM    Triglyceride 94 07/12/2021 06:17 AM    CHOL/HDL Ratio 3.8 07/12/2021 06:17 AM     Thyroid Function  Lab Results   Component Value Date/Time    TSH 0.71 07/09/2021 01:36 PM     Assessment/Plan:  Recommended baseline laboratory monitoring has been completed based on this patient's current medication regimen. No further interventions are needed at this time. Follow-up metabolic monitoring labs should be completed in 3 months (quarterly for the first year of antipsychotic therapy).      Pablo Gore, BROOKED

## 2021-07-13 NOTE — PROGRESS NOTES
Problem: Altered Thought Process (Adult/Pediatric)  Goal: *STG: Participates in treatment plan  Outcome: Progressing Towards Goal  Pt complies with meds. No evidence of paranoia.   2015  Trazadone 50 mg administered per pt request, to promote rest.

## 2021-07-13 NOTE — PROGRESS NOTES
Patient verbalized \" feeling more at ease and medications are helping me. \"        Problem: Altered Thought Process (Adult/Pediatric)  Goal: *STG: Participates in treatment plan  Outcome: Progressing Towards Goal  Consistent attendance in treatment team   Goal: *STG: Seeks staff when feelings of anxiety and fear arise  Outcome: Progressing Towards Goal   Increased engagement with staff eye contact improving    Problem: Altered Thought Process (Adult/Pediatric)  Goal: *STG: Demonstrates ability to understand and use improved judgment in daily activities and relationships  Outcome:  Progressing Towards Goal   Visible on unit consumed greater than 75% breakfast consumed. Denies SI/HI.

## 2021-07-13 NOTE — INTERDISCIPLINARY ROUNDS
Behavioral Health Interdisciplinary Rounds     Patient Name: Linda Youngblood  Age: 39 y.o. Room/Bed:  728/02  Primary Diagnosis: <principal problem not specified>   Admission Status: TDO     Readmission within 30 days: no  Power of  in place: no  Patient requires a blocked bed: no          Reason for blocked bed:     VTE Prophylaxis: No    Mobility needs/Fall risk: no  Flu Vaccine :    Nutritional Plan: no  Consults:          Labs/Testing due today?: no    Sleep hours: 7       Participation in Care/Groups:  yes  Medication Compliant?: Yes  PRNS (last 24 hours): Sleep Aid    Restraints (last 24 hours):  no     CIWA (range last 24 hours):     COWS (range last 24 hours):      Alcohol screening (AUDIT) completed -   AUDIT Score: 0     If applicable, date SBIRT discussed in treatment team AND documented:   AUDIT Screen Score: AUDIT Score: 0      Tobacco - patient is a smoker: Have You Used Tobacco in the Past 30 Days: No  Illegal Drugs use: Have You Used Any Illegal Substances Over the Past 12 Months: No    24 hour chart check complete: yes     Patient goal(s) for today: attend groups, take medications  Treatment team focus/goals: titrate medication, coordinate follow up. Progress note:  Pt admitted under TDO and involuntarily committed. He is alert, oriented, calm and engaged. He reported he had previously gone to [facility] in [facility] when he was paranoid people are following him. He refused meds at the time but says he feels much better taking the medications here on the unit. 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 MSW spoke with Pt's wife, Anthony Nicolas  ([phone]) who reported visiting with Pt today and feel he \"looks 85% better. \"  Pt's mom, Courtney Long, says \"my son look terrific\" they had a long talk with him and are trying to convince him to discharge to be with family in [place] rather than return to [facility]. They feel he has too much stress and not enough support in [place].  Wife and mom are in agreement; Pt said he would think about it. MSW sent referral to [facility] per family request.       LOS:  2  Expected LOS: TBD     Financial concerns/prescription coverage:  TDO  Family contact: wife, Jorge Alberto North ([phone]) SELIN signed.   Family requesting physician contact today:  no  Discharge plan: return home with wife  Access to weapons : na            Outpatient provider(s): TBD  Patient's preferred phone number for follow up call : [phone]   Patient's preferred e-mail address :     Participating treatment team members: Jag Cody MSW; Lazarus Marcus NP; Estefany Marion, FAY; Donald Cates, ReyesD

## 2021-07-13 NOTE — PROGRESS NOTES
Problem: Discharge Planning  Goal: *Discharge to safe environment  Outcome: Progressing Towards Goal  Note: Patient has supportive family and safe place to discharge. Goal: *Knowledge of medication management  Outcome: Progressing Towards Goal  Note: Patient agrees to take medications as prescribed. Goal: *Knowledge of discharge instructions  Outcome: Progressing Towards Goal  Note: Patient verbalizes understanding goals for tx and safe discharge.

## 2021-07-13 NOTE — BH NOTES
PSYCHIATRIC PROGRESS NOTE       Patient: Christ Stover MRN: [id number]  SSN: [id number]    YOB: 1985  Age: 39 y.o. Sex: male      Admit Date: 7/10/2021    LOS: 2 days       Chief Complaint:  Very good. Interval History:  Christ Stover states he is doing pretty good. He is calm and pleasant. After talking to him at length, he finally admitted that he was inpatient in [place], he thought somebody was following him, \"I was having a lot of paranoia. \" He says he refused the medication he was prescribed in [place]. He saw a psychiatrist in [place] outpatient and he plans to continue seeing the psychiatrist. Denies si hi or avh. Denies paranoia or delusions. States since being here, he feels more relaxed, sleeping a lot, appetite is fair. He is compliant with his meds and denies side effects. SW will reach out to his wife. Past Medical History:  Past Medical History:   Diagnosis Date    Schizo-affective schizophrenia ([facility] [address]          ALLERGIES:(reviewed/updated 7/13/2021)  No Known Allergies    Laboratory report:  Lab Results   Component Value Date/Time    WBC 8.0 07/10/2021 09:12 PM    HGB 15.0 07/10/2021 09:12 PM    HCT 44.4 07/10/2021 09:12 PM    PLATELET 644 64/74/9116 09:12 PM    MCV 88.8 07/10/2021 09:12 PM      Lab Results   Component Value Date/Time    Sodium 136 07/10/2021 09:12 PM    Potassium 3.6 07/10/2021 09:12 PM    Chloride 104 07/10/2021 09:12 PM    CO2 25 07/10/2021 09:12 PM    Anion gap 7 07/10/2021 09:12 PM    Glucose 97 07/12/2021 06:17 AM    BUN 14 07/10/2021 09:12 PM    Creatinine 0.75 07/10/2021 09:12 PM    BUN/Creatinine ratio 19 07/10/2021 09:12 PM    GFR est AA >60 07/10/2021 09:12 PM    GFR est non-AA >60 07/10/2021 09:12 PM    Calcium 9.7 07/10/2021 09:12 PM    Bilirubin, total 0.9 07/10/2021 09:12 PM    Alk.  phosphatase 88 07/10/2021 09:12 PM    Protein, total 7.5 07/10/2021 09:12 PM    Albumin 3.9 07/10/2021 09:12 PM    Globulin 3.6 07/10/2021 09:12 PM    A-G Ratio 1.1 07/10/2021 09:12 PM    ALT (SGPT) 23 07/10/2021 09:12 PM      Vitals:    07/12/21 0802 07/12/21 1620 07/12/21 1940 07/13/21 0807   BP: 117/74 102/66 104/73 109/72   Pulse: 76 78 87 96   Resp: 16 16 16 16   Temp: 97.8 °F (36.6 °C) 98 °F (36.7 °C) 97.5 °F (36.4 °C) 98.1 °F (36.7 °C)   SpO2: 98% 97% 99%    Weight:       Height:           No results found for: VALF2, VALAC, VALP, VALPR, DS6, CRBAM, CRBAMP, CARB2, XCRBAM  No results found for: LITHM    Vital Signs  Patient Vitals for the past 24 hrs:   Temp Pulse Resp BP SpO2   07/13/21 0807 98.1 °F (36.7 °C) 96 16 109/72    07/12/21 1940 97.5 °F (36.4 °C) 87 16 104/73 99 %   07/12/21 1620 98 °F (36.7 °C) 78 16 102/66 97 %     Wt Readings from Last 3 Encounters:   07/11/21 79.4 kg (175 lb)     Temp Readings from Last 3 Encounters:   07/13/21 98.1 °F (36.7 °C)   07/10/21 98.5 °F (36.9 °C)     BP Readings from Last 3 Encounters:   07/13/21 109/72   07/10/21 134/80   07/09/21 125/79     Pulse Readings from Last 3 Encounters:   07/13/21 96   07/10/21 78   07/09/21 77       Radiology (reviewed/updated 7/13/2021)  CT HEAD WO CONT    Result Date: 7/10/2021  EXAM: CT HEAD WO CONT INDICATION: Head injury. COMPARISON: None. CONTRAST: None. TECHNIQUE: Unenhanced CT of the head was performed using 5 mm images. Brain and bone windows were generated. Coronal and sagittal reformats. CT dose reduction was achieved through use of a standardized protocol tailored for this examination and automatic exposure control for dose modulation. FINDINGS: The ventricles and sulci are normal in size, shape and configuration. . There is no significant white matter disease. There is no intracranial hemorrhage, extra-axial collection, or mass effect. The basilar cisterns are open. No CT evidence of acute infarct. The bone windows demonstrate no abnormalities. The visualized portions of the paranasal sinuses and mastoid air cells are clear. No acute findings.     CT SPINE CERV WO CONT    Result Date: 7/10/2021  INDICATION: fall, neck pain. Exam: Noncontrast CT of the cervical spine is performed with 2.5 mm collimation. Sagittal and coronal reformatted images were also performed. CT dose reduction was achieved through use of a standardized protocol tailored for this examination and automatic exposure control for dose modulation. FINDINGS: There is no acute fracture or subluxation. The prevertebral soft tissues are within normal limits. Bones are well mineralized. Remaining visualized soft tissues are normal.     No acute fracture or subluxation. Current Facility-Administered Medications   Medication Dose Route Frequency Provider Last Rate Last Admin    risperiDONE (RisperDAL) 1 mg/mL oral solution soln 1 mg  1 mg Oral BID Jesusita Landeros, NP   1 mg at 07/13/21 0844    OLANZapine (ZyPREXA) tablet 5 mg  5 mg Oral Q6H PRN Jesusita Landeros, NP        haloperidol lactate (HALDOL) injection 5 mg  5 mg IntraMUSCular Q6H PRN Jesusita Landeros NP        benztropine (COGENTIN) tablet 1 mg  1 mg Oral BID PRN Jesusita Landeros NP        diphenhydrAMINE (BENADRYL) injection 50 mg  50 mg IntraMUSCular BID PRN Jesusita Landeros NP        hydrOXYzine HCL (ATARAX) tablet 50 mg  50 mg Oral TID PRN Jesusita Landeros NP        LORazepam (ATIVAN) injection 1 mg  1 mg IntraMUSCular Q4H PRN Jesusita Landeros, NP        traZODone (DESYREL) tablet 50 mg  50 mg Oral QHS PRN Jesusita Landeros, NP   50 mg at 07/12/21 2034    acetaminophen (TYLENOL) tablet 650 mg  650 mg Oral Q4H PRN Jesusita Landeros, NP   650 mg at 07/11/21 2128    magnesium hydroxide (MILK OF MAGNESIA) 400 mg/5 mL oral suspension 30 mL  30 mL Oral DAILY PRN Jesusita Landeros NP           Side Effects: (reviewed/updated 7/13/2021)  None reported or admitted to.     Review of Systems: (reviewed/updated 7/13/2021)  Appetite: good  Sleep: good   All other Review of Systems: negative    Mental Status Exam:  Eye contact: Good eye contact  Psychomotor activity: decreased  Speech is spontaneous  Thought process: Logical and goal directed   Mood is \"ok\"  Affect: Blunted  Perception: No avh  Suicidal ideation: No si  Homicidal ideation: No hi  Insight/judgment: Poor  Cognition is grossly intact      Physical Exam:  Musculoskeletal system: steady gait  Tremor not present  Cog wheeling not present      Assessment and Plan:  Fernando Vazquez meets criteria for a diagnosis of Unspecified psychotic disorder, ruled out schizophreniform disorder. Continue current medications as prescribed. We will closely monitor for safety. We will encourage reality orientation. Disposition planning to continue. I certify that this patients inpatient psychiatric hospital services furnished since the previous certification were, and continue to be, required for treatment that could reasonably be expected to improve the patient's condition, or for diagnostic study, and that the patient continues to need, on a daily basis, active treatment furnished directly by or requiring the supervision of inpatient psychiatric facility personnel. In addition, the hospital records show that services furnished were intensive treatment services, admission or related services, or equivalent services.       Signed:  Bryan Velasquez NP  7/13/2021

## 2021-07-14 PROCEDURE — 74011250637 HC RX REV CODE- 250/637: Performed by: NURSE PRACTITIONER

## 2021-07-14 PROCEDURE — 65220000003 HC RM SEMIPRIVATE PSYCH

## 2021-07-14 RX ADMIN — Medication 1 MG: at 09:20

## 2021-07-14 RX ADMIN — TRAZODONE HYDROCHLORIDE 50 MG: 50 TABLET ORAL at 20:18

## 2021-07-14 RX ADMIN — Medication 1 MG: at 17:24

## 2021-07-14 NOTE — PROGRESS NOTES
Patient educated on fall precautions while taking sedative medications. Patient smiling at staff, receptive to new staff at shift change. Patient appears much better in affect and mood. Will continue to educate and monitor. Problem: Falls - Risk of  Goal: *Absence of Falls  Description: Document [place] Fall Risk and appropriate interventions in the flowsheet.   Outcome: Progressing Towards Goal  Note: Fall Risk Interventions:            Medication Interventions: Teach patient to arise slowly, Evaluate medications/consider consulting pharmacy                   Problem: Altered Thought Process (Adult/Pediatric)  Goal: *STG: Seeks staff when feelings of anxiety and fear arise  Outcome: Progressing Towards Goal

## 2021-07-14 NOTE — PROGRESS NOTES
Problem: Altered Thought Process (Adult/Pediatric)  Goal: *STG: Participates in treatment plan  Outcome: Progressing Towards Goal  Goal: *STG: Seeks staff when feelings of anxiety and fear arise  Outcome: Progressing Towards Goal  Goal: *STG: Decreased hallucinations  Outcome: Progressing Towards Goal  Goal: *STG: Demonstrates ability to understand and use improved judgment in daily activities and relationships  Outcome: Progressing Towards Goal

## 2021-07-14 NOTE — PROGRESS NOTES
Problem: Falls - Risk of  Goal: *Absence of Falls  Description: Document [place] Fall Risk and appropriate interventions in the flowsheet. Outcome: Progressing Towards Goal  Note: Fall Risk Interventions:            Medication Interventions: Teach patient to arise slowly       Patient received, appears to be asleep, eyes closed, breathing even and unlabored. No signs of distress noted. Will continue to monitor for safety.

## 2021-07-14 NOTE — INTERDISCIPLINARY ROUNDS
Behavioral Health Interdisciplinary Rounds     Patient Name: Megan Dumont  Age: 39 y.o. Room/Bed:  730/02  Primary Diagnosis: <principal problem not specified>   Admission Status: Involuntary Commitment     Readmission within 30 days: no  Power of  in place: no  Patient requires a blocked bed: no          Reason for blocked bed:     VTE Prophylaxis: No    Mobility needs/Fall risk: no  Flu Vaccine :    Nutritional Plan: no  Consults:          Labs/Testing due today?: no    Sleep hours: 7+       Participation in Care/Groups:    Medication Compliant?: Yes  PRNS (last 24 hours): Sleep Aid    Restraints (last 24 hours):  no     CIWA (range last 24 hours):     COWS (range last 24 hours):      Alcohol screening (AUDIT) completed -   AUDIT Score: 0     If applicable, date SBIRT discussed in treatment team AND documented:   AUDIT Screen Score: AUDIT Score: 0      Document Brief Intervention (corresponds directly with the 5 A's, Ask, Advise, Assess, Assist, and Arrange): At- Risk Patients (Score 7-15 for women; 8-15 for men)  Discuss concern patient is drinking at unhealthy levels known to increase risk of alcohol-related health problems. Is Patient ready to commit to change? If No:   Encourage reflection   Discuss short term and long term health risks of consuming alcohol   Barriers to change   Reaffirm willingness to help / Educational materials provided  If Yes:   Set goal  Ambitious Minds provided    Harmful use or Dependence (Score 16 or greater)   Discuss short term and long term health risks of consuming alcohol   Recommendations   Negotiate drinking goal   Recommend addiction specialist/center   Arrange follow-up appointments.     Tobacco - patient is a smoker: Have You Used Tobacco in the Past 30 Days: No  Illegal Drugs use: Have You Used Any Illegal Substances Over the Past 12 Months: No    24 hour chart check complete: yes     Patient goal(s) for today: attend groups, take medications  Treatment team focus/goals: titrate medication, coordinate follow up. Progress note:  Pt presents with brighter affect, is calm and pleasant, denies SI/HI/AVH and paranoid ideations. He reports sleeping better and feeling more relaxed. MSW discussed discharge planning with his wife and mother. Pt will be going to [place] to stay with his mother post-discharge. A PCP appt has been scheduled. Pt will discharge, Thursday.     Financial concerns/prescription coverage:  Family contact: wife, Lindsey L Street, Candance Prime ([phone]) SELIN signed. Family requesting physician contact today:  no  Discharge plan: return home with wife  Access to weapons : na            Outpatient provider(s): [facility]  Patient's preferred phone number for follow up call : [phone]   Patient's preferred e-mail address :     Participating treatment team members: RILEY Reilly MSW; Radha Villarreal NP; Carmen Aguila RN; Kallie Yoo, PharmD    LOS:  3  Expected LOS: 4

## 2021-07-14 NOTE — BH NOTES
PSYCHIATRIC PROGRESS NOTE       Patient: Jae Camacho MRN: [id number]  SSN: [id number]    YOB: 1985  Age: 39 y.o. Sex: male      Admit Date: 7/10/2021    LOS: 3 days       Chief Complaint:  Very good. Interval History:  Jae Camacho is calm and pleasant. He states he is feeling well, he denies si hi or avh. Denies paranoia or delusions. He's been appropriate in the unit, no psychosis or bizarre behaviors noted. He is compliant with his meds and denies side effects. His thought process is coherent. Sleeping and eating well. SW spoke to patient's wife, and per wife patient is \"85% better\". His mother also said that his son looks \"terrific. \" We will plan for dc in am.        Past Medical History:  Past Medical History:   Diagnosis Date    Schizo-affective schizophrenia ([facility] [address]          ALLERGIES:(reviewed/updated 7/14/2021)  No Known Allergies    Laboratory report:  Lab Results   Component Value Date/Time    WBC 8.0 07/10/2021 09:12 PM    HGB 15.0 07/10/2021 09:12 PM    HCT 44.4 07/10/2021 09:12 PM    PLATELET 804 75/99/1335 09:12 PM    MCV 88.8 07/10/2021 09:12 PM      Lab Results   Component Value Date/Time    Sodium 136 07/10/2021 09:12 PM    Potassium 3.6 07/10/2021 09:12 PM    Chloride 104 07/10/2021 09:12 PM    CO2 25 07/10/2021 09:12 PM    Anion gap 7 07/10/2021 09:12 PM    Glucose 97 07/12/2021 06:17 AM    BUN 14 07/10/2021 09:12 PM    Creatinine 0.75 07/10/2021 09:12 PM    BUN/Creatinine ratio 19 07/10/2021 09:12 PM    GFR est AA >60 07/10/2021 09:12 PM    GFR est non-AA >60 07/10/2021 09:12 PM    Calcium 9.7 07/10/2021 09:12 PM    Bilirubin, total 0.9 07/10/2021 09:12 PM    Alk.  phosphatase 88 07/10/2021 09:12 PM    Protein, total 7.5 07/10/2021 09:12 PM    Albumin 3.9 07/10/2021 09:12 PM    Globulin 3.6 07/10/2021 09:12 PM    A-G Ratio 1.1 07/10/2021 09:12 PM    ALT (SGPT) 23 07/10/2021 09:12 PM      Vitals:    07/13/21 1256 07/13/21 1603 07/13/21 1922 07/14/21 0753   BP: 108/73 100/66 134/77 108/69   Pulse: 85 90 81 80   Resp: 16 16 16 16   Temp: 97.3 °F (36.3 °C) 98.1 °F (36.7 °C) 97.7 °F (36.5 °C) 97.9 °F (36.6 °C)   SpO2: 98% 96% 99% 98%   Weight:       Height:           No results found for: VALF2, VALAC, VALP, VALPR, DS6, CRBAM, CRBAMP, CARB2, XCRBAM  No results found for: LITHM    Vital Signs  Patient Vitals for the past 24 hrs:   Temp Pulse Resp BP SpO2   07/14/21 0753 97.9 °F (36.6 °C) 80 16 108/69 98 %   07/13/21 1922 97.7 °F (36.5 °C) 81 16 134/77 99 %   07/13/21 1603 98.1 °F (36.7 °C) 90 16 100/66 96 %   07/13/21 1256 97.3 °F (36.3 °C) 85 16 108/73 98 %     Wt Readings from Last 3 Encounters:   07/11/21 79.4 kg (175 lb)     Temp Readings from Last 3 Encounters:   07/14/21 97.9 °F (36.6 °C)   07/10/21 98.5 °F (36.9 °C)     BP Readings from Last 3 Encounters:   07/14/21 108/69   07/10/21 134/80   07/09/21 125/79     Pulse Readings from Last 3 Encounters:   07/14/21 80   07/10/21 78   07/09/21 77       Radiology (reviewed/updated 7/14/2021)  CT HEAD WO CONT    Result Date: 7/10/2021  EXAM: CT HEAD WO CONT INDICATION: Head injury. COMPARISON: None. CONTRAST: None. TECHNIQUE: Unenhanced CT of the head was performed using 5 mm images. Brain and bone windows were generated. Coronal and sagittal reformats. CT dose reduction was achieved through use of a standardized protocol tailored for this examination and automatic exposure control for dose modulation. FINDINGS: The ventricles and sulci are normal in size, shape and configuration. . There is no significant white matter disease. There is no intracranial hemorrhage, extra-axial collection, or mass effect. The basilar cisterns are open. No CT evidence of acute infarct. The bone windows demonstrate no abnormalities. The visualized portions of the paranasal sinuses and mastoid air cells are clear. No acute findings. CT SPINE CERV WO CONT    Result Date: 7/10/2021  INDICATION: fall, neck pain.  Exam: Noncontrast CT of the cervical spine is performed with 2.5 mm collimation. Sagittal and coronal reformatted images were also performed. CT dose reduction was achieved through use of a standardized protocol tailored for this examination and automatic exposure control for dose modulation. FINDINGS: There is no acute fracture or subluxation. The prevertebral soft tissues are within normal limits. Bones are well mineralized. Remaining visualized soft tissues are normal.     No acute fracture or subluxation. Current Facility-Administered Medications   Medication Dose Route Frequency Provider Last Rate Last Admin    risperiDONE (RisperDAL) 1 mg/mL oral solution soln 1 mg  1 mg Oral BID Jesusita Landeros, NP   1 mg at 07/14/21 0920    OLANZapine (ZyPREXA) tablet 5 mg  5 mg Oral Q6H PRN Jesusita Landeros NP        haloperidol lactate (HALDOL) injection 5 mg  5 mg IntraMUSCular Q6H PRN Jesusita Landeros, PAULA        benztropine (COGENTIN) tablet 1 mg  1 mg Oral BID PRN Jesusita Landeros NP        diphenhydrAMINE (BENADRYL) injection 50 mg  50 mg IntraMUSCular BID PRN Jesusita Landeros NP        hydrOXYzine HCL (ATARAX) tablet 50 mg  50 mg Oral TID PRN Jesusita Landeros NP        LORazepam (ATIVAN) injection 1 mg  1 mg IntraMUSCular Q4H PRN Jesusita Landeros NP        traZODone (DESYREL) tablet 50 mg  50 mg Oral QHS PRN Jesusita Landeros, NP   50 mg at 07/13/21 2012    acetaminophen (TYLENOL) tablet 650 mg  650 mg Oral Q4H PRN Jesusita Landeros NP   650 mg at 07/11/21 2128    magnesium hydroxide (MILK OF MAGNESIA) 400 mg/5 mL oral suspension 30 mL  30 mL Oral DAILY PRN Jesusita Landeros NP           Side Effects: (reviewed/updated 7/14/2021)  None reported or admitted to.     Review of Systems: (reviewed/updated 7/14/2021)  Appetite: good  Sleep: good   All other Review of Systems: negative    Mental Status Exam:  Eye contact: Good eye contact  Psychomotor activity: wnl  Speech is spontaneous  Thought process: Logical and goal directed   Mood is \"ok\"  Affect: full  Perception: No avh  Suicidal ideation: No si  Homicidal ideation: No hi  Insight/judgment: Poor  Cognition is grossly intact      Physical Exam:  Musculoskeletal system: steady gait  Tremor not present  Cog wheeling not present      Assessment and Plan:  Conrad Lal meets criteria for a diagnosis of Unspecified psychotic disorder, ruled out schizophreniform disorder. Continue current medications as prescribed. We will closely monitor for safety. We will encourage reality orientation. Plan for dc in am.      I certify that this patients inpatient psychiatric hospital services furnished since the previous certification were, and continue to be, required for treatment that could reasonably be expected to improve the patient's condition, or for diagnostic study, and that the patient continues to need, on a daily basis, active treatment furnished directly by or requiring the supervision of inpatient psychiatric facility personnel. In addition, the hospital records show that services furnished were intensive treatment services, admission or related services, or equivalent services.       Signed:  Eve Santos NP  7/14/2021

## 2021-07-15 VITALS
TEMPERATURE: 97.9 F | RESPIRATION RATE: 16 BRPM | DIASTOLIC BLOOD PRESSURE: 66 MMHG | HEART RATE: 85 BPM | WEIGHT: 175 LBS | BODY MASS INDEX: 28.12 KG/M2 | OXYGEN SATURATION: 98 % | HEIGHT: 66 IN | SYSTOLIC BLOOD PRESSURE: 113 MMHG

## 2021-07-15 PROCEDURE — 74011250637 HC RX REV CODE- 250/637: Performed by: NURSE PRACTITIONER

## 2021-07-15 RX ORDER — RISPERIDONE 2 MG/1
2 TABLET, FILM COATED ORAL
Qty: 30 TABLET | Refills: 0 | Status: SHIPPED | OUTPATIENT
Start: 2021-07-15 | End: 2021-07-15

## 2021-07-15 RX ORDER — RISPERIDONE 2 MG/1
2 TABLET, ORALLY DISINTEGRATING ORAL
Qty: 30 TABLET | Refills: 0 | Status: SHIPPED | OUTPATIENT
Start: 2021-07-15

## 2021-07-15 RX ORDER — TRAZODONE HYDROCHLORIDE 50 MG/1
50 TABLET ORAL
Qty: 30 TABLET | Refills: 0 | Status: SHIPPED | OUTPATIENT
Start: 2021-07-15

## 2021-07-15 RX ADMIN — Medication 1 MG: at 09:21

## 2021-07-15 NOTE — INTERDISCIPLINARY ROUNDS
Behavioral Health Interdisciplinary Rounds     Patient Name: Schuyler Angelucci  Age: 39 y.o. Room/Bed:  730/02  Primary Diagnosis: <principal problem not specified>   Admission Status: Involuntary Commitment     Readmission within 30 days: no  Power of  in place: no  Patient requires a blocked bed: no          Reason for blocked bed:     VTE Prophylaxis: No    Mobility needs/Fall risk: no  Flu Vaccine :    Nutritional Plan: no  Consults:          Labs/Testing due today?: no    Sleep hours: 8       Participation in Care/Groups:  yes  Medication Compliant?: Yes  PRNS (last 24 hours): Sleep Aid    Restraints (last 24 hours):  no     CIWA (range last 24 hours):     COWS (range last 24 hours):      Alcohol screening (AUDIT) completed -   AUDIT Score: 0     If applicable, date SBIRT discussed in treatment team AND documented:   AUDIT Screen Score: AUDIT Score: 0      Document Brief Intervention (corresponds directly with the 5 A's, Ask, Advise, Assess, Assist, and Arrange): At- Risk Patients (Score 7-15 for women; 8-15 for men)  Discuss concern patient is drinking at unhealthy levels known to increase risk of alcohol-related health problems. Is Patient ready to commit to change? If No:   Encourage reflection   Discuss short term and long term health risks of consuming alcohol   Barriers to change   Reaffirm willingness to help / Educational materials provided  If Yes:   Set goal  [employer] provided    Harmful use or Dependence (Score 16 or greater)   Discuss short term and long term health risks of consuming alcohol   Recommendations   Negotiate drinking goal   Recommend addiction specialist/center   Arrange follow-up appointments.     Tobacco - patient is a smoker: Have You Used Tobacco in the Past 30 Days: No  Illegal Drugs use: Have You Used Any Illegal Substances Over the Past 12 Months: No    24 hour chart check complete: yes     Patient goal(s) for today: Readiness for D/C  Treatment team focus/goals: Eval for safe d/c - Pt d/c to his mother   Progress note Pt d/c today Denied SI/HI     LOS:  4  Expected LOS: D/C Today     Financial concerns/prescription coverage:    Family contact:   Mother     Family requesting physician contact today:  no  Discharge plan: D/C to his mother - transport back to [place]  Access to weapons : no        Outpatient provider(s): Dr Patrick Paredes on 7/28/2021 @ 2:45 PM  Patient's preferred phone number for follow up call :   Patient's preferred e-mail address :  Participating treatment team members: Bette Braswell RN, Radha ChambersD and Fany Nguyen NP

## 2021-07-15 NOTE — PROGRESS NOTES
Pt appears asleep in bed, NAD, even respirations, will continue to monitor q15min     Problem: Falls - Risk of  Goal: *Absence of Falls  Description: Document Abbie Fry Fall Risk and appropriate interventions in the flowsheet.   Outcome: Progressing Towards Goal  Note: Fall Risk Interventions:            Medication Interventions: Teach patient to arise slowly

## 2021-07-15 NOTE — PROGRESS NOTES
Pharmacist Discharge Medication Reconciliation    Discharging Provider: Dani Michel NP    Significant PMH:   Past Medical History:   Diagnosis Date    Schizo-affective schizophrenia [facility])      Chief Complaint for this Admission:   Chief Complaint   Patient presents with    Mental Health Problem    Fall     Allergies: Patient has no known allergies. Discharge Medications:   Current Discharge Medication List        START taking these medications    Details   traZODone (DESYREL) 50 mg tablet Take 1 Tablet by mouth nightly as needed for Sleep (For insomnia). Indications: insomnia associated with depression  Qty: 30 Tablet, Refills: 0  Start date: 7/15/2021      risperiDONE (RisperDAL m-tabs) 2 mg disintegrating tablet Take 1 Tablet by mouth nightly.  Indications: psychosis  Qty: 30 Tablet, Refills: 0  Start date: 7/15/2021           The patient's chart, MAR and AVS were reviewed by BROOKE EdwardD.

## 2021-07-15 NOTE — BH NOTES
Behavioral Health Transition Record to Provider    Patient Name: Linda Youngblood  YOB: 1985  Medical Record Number: [id number]  Date of Admission: 7/10/2021  Date of Discharge: 7/15/2021    Attending Provider: No att. providers found  Discharging Provider: 7/15/2021  To contact this individual call [phone] and ask the  to page. If unavailable, ask to be transferred to [facility] Provider on call. [facility] Provider will be available on call 24/7 and during holidays. Primary Care Provider: Other, MD Bakari    No Known Allergies    Reason for Admission:     Marjorie Gilbert NP   Nurse Practitioner   PSYCHIATRY   Discharge Summary   Signed   Date of Service:  07/15/21 1105               Expand AllCollapse All      [x]Hide copied text    []Akilah for details  PSYCHIATRIC DISCHARGE SUMMARY     Patient: Linda Youngblood MRN: [id number]  SSN: [id number]    YOB: 1985  Age: 39 y.o. Sex: male          Date of Admission: 7/10/2021  Date of Discharge:7/15/2021        Type of Discharge:  REGULAR      Admission data:  CHIEF COMPLAINT:  \"I passed out. \"     HISTORY OF PRESENT ILLNESS:  The patient is a 24-year-old male who was initially admitted here at [address] on a temporary [facility] order. [facility] got involuntary committed from his hearing this morning. [facility] is a not a reliable informant. [facility] states that he was in a hotel. [facility] got very lightheaded, passed out, maybe lost consciousness. [facility] denies suicidal ideation or homicidal ideation.  Denies paranoia or hallucination. [facility] states that he has never been admitted in any psychiatric facility. [facility] also tells me that he has no recollection of any incident the last few days that ultimately led to his admission here at [address] [place] [address] he initially was admitted here at [facility] over the weekend, he refused to sign the admission paperwork, and so he was transferred back to the ED. [facility] was then evaluated by [facility] and did not meet TDO criteria, and so he was discharged back to his hotel with his wife, but then he came back to the emergency room that same night via EMS.  Apparently, a passerby called 911 because the patient was lying on the ground at the hotel parking lot. His wife had expressed concerns about his behavior, adding that he was recently diagnosed with schizophrenia.  His mother also had been calling the unit and stated that the patient was admitted in a psychiatric facility in [place] weeks ago for a 4-day inpatient stay and was diagnosed with schizoaffective disorder.  The wife reported on admission that the patient was \"normal\" until about 3 months ago when he started exhibiting unusual behavior. [facility] is talking to himself. [facility] has been increasingly agitated, irritable, isolating and has episodes of crying.  He reported that he was \"Baker Acted\" in [place] that led to his admission at [address] [facility] patient is originally from [address] [place] [address]. [place] went to [place] for vacation and were driving back to [place] when they had to stop here in [place] because of his bizarre behaviors.  Reportedly, his symptoms have been increasingly getting worse. [facility] was been locking himself in the bathroom for hours, staring at himself in the mirror, crying for hours. [facility] also has not been sleeping. Aishwarya Dior is no substance use history. Aishwarya Dior is some thought blocking noted.  He appears confused, disoriented.  From my understanding, his wife and his mom are on their way here to [facility] is admitted to the inpatient psychiatric unit for further evaluation and treatment.     PAST MEDICAL HISTORY:  See H and P.             Past Medical History:   Diagnosis Date    Schizo-affective schizophrenia ([facility]           Labs: (reviewed/updated 7/13/2021)  Patient Vitals for the past 8 hrs:    BP Temp Pulse Resp   07/13/21 0807 109/72 98.1 °F (36.7 °C) 96 16                Labs Reviewed   METABOLIC PANEL, COMPREHENSIVE - Abnormal; Notable for the following components:       Result Value       AST (SGOT) 13 (*)       All other components within normal limits   URINALYSIS W/MICROSCOPIC - Abnormal; Notable for the following components:     Ketone 80 (*)       All other components within normal limits   LIPID PANEL - Abnormal; Notable for the following components:     Cholesterol, total 205 (*)       LDL, calculated 132.2 (*)       All other components within normal limits   ETHYL ALCOHOL   CBC WITH AUTOMATED DIFF   CK W/ REFLX CKMB   DRUG SCREEN, URINE   COVID-19 WITH INFLUENZA A/B   GLUCOSE, FASTING                Lab Results   Component Value Date/Time     Sodium 136 07/10/2021 09:12 PM     Potassium 3.6 07/10/2021 09:12 PM     Chloride 104 07/10/2021 09:12 PM     CO2 25 07/10/2021 09:12 PM     Anion gap 7 07/10/2021 09:12 PM     Glucose 97 07/12/2021 06:17 AM     BUN 14 07/10/2021 09:12 PM     Creatinine 0.75 07/10/2021 09:12 PM     BUN/Creatinine ratio 19 07/10/2021 09:12 PM     GFR est AA >60 07/10/2021 09:12 PM     GFR est non-AA >60 07/10/2021 09:12 PM     Calcium 9.7 07/10/2021 09:12 PM     Bilirubin, total 0.9 07/10/2021 09:12 PM     Alk. phosphatase 88 07/10/2021 09:12 PM     Protein, total 7.5 07/10/2021 09:12 PM     Albumin 3.9 07/10/2021 09:12 PM     Globulin 3.6 07/10/2021 09:12 PM     A-G Ratio 1.1 07/10/2021 09:12 PM     ALT (SGPT) 23 07/10/2021 09:12 PM                    Admission on 07/10/2021   Component Date Value Ref Range Status    ALCOHOL(ETHYL),SERUM 07/10/2021 <10  <10 MG/DL Final    WBC 07/10/2021 8.0  4.1 - 11.1 K/uL Final    RBC 07/10/2021 5.00  4. 10 - 5.70 M/uL Final    HGB 07/10/2021 15.0  12.1 - 17.0 g/dL Final    HCT 07/10/2021 44.4  36.6 - 50.3 % Final    MCV 07/10/2021 88.8  80.0 - 99.0 FL Final    MCH 07/10/2021 30.0  26.0 - 34.0 PG Final    MCHC 07/10/2021 33.8  30.0 - 36.5 g/dL Final    RDW 07/10/2021 12.9  11.5 - 14.5 % Final    PLATELET 07/10/2021 153  150 - 400 K/uL Final    MPV 07/10/2021 11.0  8.9 - 12.9 FL Final    NRBC 07/10/2021 0.0  0  WBC Final    ABSOLUTE NRBC 07/10/2021 0.00  0.00 - 0.01 K/uL Final    NEUTROPHILS 07/10/2021 65  32 - 75 % Final    LYMPHOCYTES 07/10/2021 27  12 - 49 % Final    MONOCYTES 07/10/2021 7  5 - 13 % Final    EOSINOPHILS 07/10/2021 1  0 - 7 % Final    BASOPHILS 07/10/2021 0  0 - 1 % Final    IMMATURE GRANULOCYTES 07/10/2021 0  0.0 - 0.5 % Final    ABS. NEUTROPHILS 07/10/2021 5.2  1.8 - 8.0 K/UL Final    ABS. LYMPHOCYTES 07/10/2021 2.1  0.8 - 3.5 K/UL Final    ABS. MONOCYTES 07/10/2021 0.5  0.0 - 1.0 K/UL Final    ABS. EOSINOPHILS 07/10/2021 0.1  0.0 - 0.4 K/UL Final    ABS. BASOPHILS 07/10/2021 0.0  0.0 - 0.1 K/UL Final    ABS. IMM. GRANS. 07/10/2021 0.0  0.00 - 0.04 K/UL Final    DF 07/10/2021 AUTOMATED    Final    CK 07/10/2021 72  39 - 308 U/L Final    Sodium 07/10/2021 136  136 - 145 mmol/L Final    Potassium 07/10/2021 3.6  3.5 - 5.1 mmol/L Final    Chloride 07/10/2021 104  97 - 108 mmol/L Final    CO2 07/10/2021 25  21 - 32 mmol/L Final    Anion gap 07/10/2021 7  5 - 15 mmol/L Final    Glucose 07/10/2021 88  65 - 100 mg/dL Final    BUN 07/10/2021 14  6 - 20 MG/DL Final    Creatinine 07/10/2021 0.75  0.70 - 1.30 MG/DL Final    BUN/Creatinine ratio 07/10/2021 19  12 - 20   Final    GFR est AA 07/10/2021 >60  >60 ml/min/1.73m2 Final    GFR est non-AA 07/10/2021 >60  >60 ml/min/1.73m2 Final    Calcium 07/10/2021 9.7  8.5 - 10.1 MG/DL Final    Bilirubin, total 07/10/2021 0.9  0.2 - 1.0 MG/DL Final    ALT (SGPT) 07/10/2021 23  12 - 78 U/L Final    AST (SGOT) 07/10/2021 13* 15 - 37 U/L Final    Alk.  phosphatase 07/10/2021 88  45 - 117 U/L Final    Protein, total 07/10/2021 7.5  6.4 - 8.2 g/dL Final    Albumin 07/10/2021 3.9  3.5 - 5.0 g/dL Final    Globulin 07/10/2021 3.6  2.0 - 4.0 g/dL Final    A-G Ratio 07/10/2021 1.1  1.1 - 2.2   Final    AMPHETAMINES 07/11/2021 Negative  NEG   Final    BARBITURATES 07/11/2021 Negative  NEG   Final    BENZODIAZEPINES 07/11/2021 Negative  NEG   Final    COCAINE 07/11/2021 Negative  NEG   Final    METHADONE 07/11/2021 Negative  NEG   Final    OPIATES 07/11/2021 Negative  NEG   Final    PCP(PHENCYCLIDINE) 07/11/2021 Negative  NEG   Final    THC (TH-CANNABINOL) 07/11/2021 Negative  NEG   Final    Drug screen comment 07/11/2021 (NOTE)    Final    SARS-CoV-2 07/11/2021 Not detected  NOTD   Final    Influenza A by PCR 07/11/2021 Not detected  NOTD   Final    Influenza B by PCR 07/11/2021 Not detected  NOTD   Final    Color 07/11/2021 YELLOW/STRAW    Final    Appearance 07/11/2021 CLEAR  CLEAR   Final    Specific gravity 07/11/2021 1.028  1.003 - 1.030   Final    pH (UA) 07/11/2021 6.0  5.0 - 8.0   Final    Protein 07/11/2021 Negative  NEG mg/dL Final    Glucose 07/11/2021 Negative  NEG mg/dL Final    Ketone 07/11/2021 80* NEG mg/dL Final    Bilirubin 07/11/2021 Negative  NEG   Final    Blood 07/11/2021 Negative  NEG   Final    Urobilinogen 07/11/2021 1.0  0.2 - 1.0 EU/dL Final    Nitrites 07/11/2021 Negative  NEG   Final    Leukocyte Esterase 07/11/2021 Negative  NEG   Final    WBC 07/11/2021 0-4  0 - 4 /hpf Final    RBC 07/11/2021 0-5  0 - 5 /hpf Final    Epithelial cells 07/11/2021 FEW  FEW /lpf Final    Bacteria 07/11/2021 Negative  NEG /hpf Final    Hyaline cast 07/11/2021 2-5  0 - 5 /lpf Final    Glucose 07/12/2021 97  65 - 100 MG/DL Final    Cholesterol, total 07/12/2021 205* <200 MG/DL Final    Triglyceride 07/12/2021 94  <150 MG/DL Final    HDL Cholesterol 07/12/2021 54  MG/DL Final    LDL, calculated 07/12/2021 132.2* 0 - 100 MG/DL Final    VLDL, calculated 07/12/2021 18.8  MG/DL Final    CHOL/HDL Ratio 07/12/2021 3.8  0.0 - 5.0   Final                  Vitals:     07/12/21 0802 07/12/21 1620 07/12/21 1940 07/13/21 0807   BP: 117/74 102/66 104/73 109/72   Pulse: 76 78 87 96   Resp: 16 16 16 16   Temp: 97.8 °F (36.6 °C) 98 °F (36.7 °C) 97.5 °F (36.4 °C) 98.1 °F (36.7 °C)   SpO2: 98% 97% 99%     Weight:           Height:              Recent Results   No results found for this or any previous visit (from the past 24 hour(s)).       RADIOLOGY REPORTS:  No results found for this or any previous visit. CT HEAD WO CONT     Result Date: 7/10/2021  EXAM: CT HEAD WO CONT INDICATION: Head injury. COMPARISON: None. CONTRAST: None. TECHNIQUE: Unenhanced CT of the head was performed using 5 mm images. Brain and bone windows were generated. Coronal and sagittal reformats. CT dose reduction was achieved through use of a standardized protocol tailored for this examination and automatic exposure control for dose modulation.  FINDINGS: The ventricles and sulci are normal in size, shape and configuration. . There is no significant white matter disease. There is no intracranial hemorrhage, extra-axial collection, or mass effect. The basilar cisterns are open. No CT evidence of acute infarct. The bone windows demonstrate no abnormalities. The visualized portions of the paranasal sinuses and mastoid air cells are clear.      No acute findings.     CT SPINE CERV WO CONT     Result Date: 7/10/2021  INDICATION: fall, neck pain. Exam: Noncontrast CT of the cervical spine is performed with 2.5 mm collimation. Sagittal and coronal reformatted images were also performed. CT dose reduction was achieved through use of a standardized protocol tailored for this examination and automatic exposure control for dose modulation. FINDINGS: There is no acute fracture or subluxation. The prevertebral soft tissues are within normal limits. Bones are well mineralized.  Remaining visualized soft tissues are normal.      No acute fracture or subluxation.                         PAST PSYCHIATRIC HISTORY:  The patient had no prior history until about 3 months ago when he started acting bizarre that led to his admission in a psychiatric facility in [facility] weeks ago, and he stayed for 4 days.  Unfortunately, his family could not remember what medications he was on. Osei Anguiano has been diagnosed with schizophrenia versus schizoaffective disorder per his family.     PSYCHOSOCIAL HISTORY: Osei Anguiano has been  for a little over a year. Osei Anguiano has no children. 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 Osei Anguiano reports his mood is okay.  Affect is constricted.  Speech:  Soft, incoherent. He denies suicidal ideation, homicidal ideation, or auditory or visual hallucination, but thought blocking is noted. [place]  is noted.  Cognitively, he is awake and alert, but a formal memory testing was not completed. Margaree Angela is poor.  Judgment is poor.     DIAGNOSIS:  Unspecified psychotic disorder, ruled out schizophreniform disorder.        Hospital Course:     Patient was admitted to the Psychiatric services for acute psychiatric stabilization in regards to symptomatology as described in the HPI above and placed on Q15 minute checks and suicide precautions. *Standing medications were ordered. He was started on risperdal. While on the unit Rosamaria Resendiz was involved in individual, group, occupational and milieu therapy. He improved gradually and was able to integrate into the milieu with help from the nursing staff. Patients symptoms improved gradually including psychosis (paranoia, hallucinations,) bizarre behaviors. He was appropriate in his interactions, and cooperative with medications and the unit routine. Please see individual progress notes for more specific details regarding patient's hospitalization course. Patient was discharged as per the plan. He had been doing well on the unit as per the report of the nursing staff and my observations. No PRN medication for agitation, seclusion or restraints were required during the last 48 hours of his stay.  Rosamaria Resendiz had improved progressively to the point of being stable for discharge and outpatient FU. At this time he did not offer any complaints. Patient denied any SI or HI. Denied any AH or VH. He denied any delusions. Was not considered a danger to self or to others and is safe for discharge. Will FU with his appointments and remains motivated to be in treatment. The patient verbalized understanding of his discharge instructions.            Allergies:(reviewed/updated 7/15/2021)  No Known Allergies     Side Effects: (reviewed/updated 7/15/2021)  None reported or admitted to.     Vital Signs:  Patient Vitals for the past 24 hrs:    Temp Pulse Resp BP SpO2   07/15/21 0811 97.9 °F (36.6 °C) 85 16 113/66 98 %   07/14/21 1628 98.2 °F (36.8 °C) 75 18 101/64 97 %          Wt Readings from Last 3 Encounters:   07/11/21 79.4 kg (175 lb)          Temp Readings from Last 3 Encounters:   07/15/21 97.9 °F (36.6 °C)   07/10/21 98.5 °F (36.9 °C)          BP Readings from Last 3 Encounters:   07/15/21 113/66   07/10/21 134/80   07/09/21 125/79          Pulse Readings from Last 3 Encounters:   07/15/21 85   07/10/21 78   07/09/21 77         Labs: (reviewed/updated 7/15/2021)  Recent Results   No results found for this or any previous visit (from the past 24 hour(s)). No results found for: VALF2, VALAC, VALP, VALPR, DS6, CRBAM, CRBAMP, CARB2, XCRBAM  No results found for: [facility] EVALUATION [place]     Radiology (reviewed/updated 7/15/2021)  CT HEAD WO CONT     Result Date: 7/10/2021  EXAM: CT HEAD WO CONT INDICATION: Head injury. COMPARISON: None. CONTRAST: None. TECHNIQUE: Unenhanced CT of the head was performed using 5 mm images. Brain and bone windows were generated. Coronal and sagittal reformats. CT dose reduction was achieved through use of a standardized protocol tailored for this examination and automatic exposure control for dose modulation. FINDINGS: The ventricles and sulci are normal in size, shape and configuration. . There is no significant white matter disease. There is no intracranial hemorrhage, extra-axial collection, or mass effect. The basilar cisterns are open. No CT evidence of acute infarct. The bone windows demonstrate no abnormalities. The visualized portions of the paranasal sinuses and mastoid air cells are clear.      No acute findings.     CT SPINE CERV WO CONT     Result Date: 7/10/2021  INDICATION: fall, neck pain. Exam: Noncontrast CT of the cervical spine is performed with 2.5 mm collimation. Sagittal and coronal reformatted images were also performed. CT dose reduction was achieved through use of a standardized protocol tailored for this examination and automatic exposure control for dose modulation. FINDINGS: There is no acute fracture or subluxation. The prevertebral soft tissues are within normal limits. Bones are well mineralized. Remaining visualized soft tissues are normal.      No acute fracture or subluxation.             Mental Status Exam on Discharge:  General appearance:   Trent Chambers is a 39 y.o. DECLINED male who is well groomed, psychomotor activity is WNL  Eye contact: makes good eye contact  Speech: Spontaneous and coherent  Affect : Euthymic  Mood: \"OK\"  Thought Process: Logical, goal directed  Perception: Denies any AH or VH. Thought Content: Denies any SI or Plan  Insight: Partial  Judgement: Fair  Cognition: Intact grossly.        Discharge Diagnosis:  Unspecified psychotic disorder.            Current Discharge Medication List           START taking these medications     Details   traZODone (DESYREL) 50 mg tablet Take 1 Tablet by mouth nightly as needed for Sleep (For insomnia). Indications: insomnia associated with depression  Qty: 30 Tablet, Refills: 0  Start date: 7/15/2021       risperiDONE (RisperDAL m-tabs) 2 mg disintegrating tablet Take 1 Tablet by mouth nightly.  Indications: psychosis  Qty: 30 Tablet, Refills: 0  Start date: 7/15/2021                                Follow-up Information      Follow up With Specialties Details Why Svitlana Rodríguez on 7/28/2021 2:45pm appt IN-PERSON for medication mgmt and primary care services [address].  [phone]  FAX: [phone]     Other, MD Bakari       Patient can only remember the practice name and not the physician             WOUND CARE: none needed.        Prognosis:   Good / Lorren Schilder based on nature of patient's pathology/ies and treatment compliance issues. Prognosis is greatly dependent upon patient's ability to  follow up on psychiatric/psychotherapy appointments as well as to comply with psychiatric medications as prescribed.        I certify that this patients inpatient psychiatric hospital services furnished since the previous certification were, and continue to be, required for treatment that could reasonably be expected to improve the patient's condition, or for diagnostic study, and that the patient continues to need, on a daily basis, active treatment furnished directly by or requiring the supervision of inpatient psychiatric facility personnel. In addition, the hospital records show that services furnished were intensive treatment services, admission or related services, or equivalent services.      Signed:  Anu Strauss NP  7/15/2021      Cosigned by: Laura Ribera MD at 07/15/21 1140   Revision History                              Ad[facility]: [facility] [F25.9]    * No surgery found *    Results for orders placed or performed during the hospital encounter of 07/10/21   ETHYL ALCOHOL   Result Value Ref Range    ALCOHOL(ETHYL),SERUM <10 <10 MG/DL   CBC WITH AUTOMATED DIFF   Result Value Ref Range    WBC 8.0 4.1 - 11.1 K/uL    RBC 5.00 4. 10 - 5.70 M/uL    HGB 15.0 12.1 - 17.0 g/dL    HCT 44.4 36.6 - 50.3 %    MCV 88.8 80.0 - 99.0 FL    MCH 30.0 26.0 - 34.0 PG    MCHC 33.8 30.0 - 36.5 g/dL    RDW 12.9 11.5 - 14.5 %    PLATELET 421 406 - 515 K/uL    MPV 11.0 8.9 - 12.9 FL    NRBC 0.0 0  WBC    ABSOLUTE NRBC 0.00 0.00 - 0.01 K/uL    NEUTROPHILS 65 32 - 75 %    LYMPHOCYTES 27 12 - 49 %    MONOCYTES 7 5 - 13 %    EOSINOPHILS 1 0 - 7 %    BASOPHILS 0 0 - 1 %    IMMATURE GRANULOCYTES 0 0.0 - 0.5 %    ABS. NEUTROPHILS 5.2 1.8 - 8.0 K/UL    ABS. LYMPHOCYTES 2.1 0.8 - 3.5 K/UL    ABS. MONOCYTES 0.5 0.0 - 1.0 K/UL    ABS. EOSINOPHILS 0.1 0.0 - 0.4 K/UL    ABS. BASOPHILS 0.0 0.0 - 0.1 K/UL    ABS. IMM. GRANS. 0.0 0.00 - 0.04 K/UL    DF AUTOMATED     CK W/ REFLX CKMB   Result Value Ref Range    CK 72 39 - 255 U/L   METABOLIC PANEL, COMPREHENSIVE   Result Value Ref Range    Sodium 136 136 - 145 mmol/L    Potassium 3.6 3.5 - 5.1 mmol/L    Chloride 104 97 - 108 mmol/L    CO2 25 21 - 32 mmol/L    Anion gap 7 5 - 15 mmol/L    Glucose 88 65 - 100 mg/dL    BUN 14 6 - 20 MG/DL    Creatinine 0.75 0.70 - 1.30 MG/DL    BUN/Creatinine ratio 19 12 - 20      GFR est AA >60 >60 ml/min/1.73m2    GFR est non-AA >60 >60 ml/min/1.73m2    Calcium 9.7 8.5 - 10.1 MG/DL    Bilirubin, total 0.9 0.2 - 1.0 MG/DL    ALT (SGPT) 23 12 - 78 U/L    AST (SGOT) 13 (L) 15 - 37 U/L    Alk.  phosphatase 88 45 - 117 U/L    Protein, total 7.5 6.4 - 8.2 g/dL    Albumin 3.9 3.5 - 5.0 g/dL    Globulin 3.6 2.0 - 4.0 g/dL    A-G Ratio 1.1 1.1 - 2.2     DRUG SCREEN, URINE   Result Value Ref Range    AMPHETAMINES Negative NEG      BARBITURATES Negative NEG      BENZODIAZEPINES Negative NEG      COCAINE Negative NEG      METHADONE Negative NEG      OPIATES Negative NEG      PCP(PHENCYCLIDINE) Negative NEG      THC (TH-CANNABINOL) Negative NEG      Drug screen comment (NOTE)    COVID-19 WITH INFLUENZA A/B   Result Value Ref Range    SARS-CoV-2 Not detected NOTD      Influenza A by PCR Not detected NOTD      Influenza B by PCR Not detected NOTD     URINALYSIS W/MICROSCOPIC   Result Value Ref Range    Color YELLOW/STRAW      Appearance CLEAR CLEAR      Specific gravity 1.028 1.003 - 1.030      pH (UA) 6.0 5.0 - 8.0 Protein Negative NEG mg/dL    Glucose Negative NEG mg/dL    Ketone 80 (A) NEG mg/dL    Bilirubin Negative NEG      Blood Negative NEG      Urobilinogen 1.0 0.2 - 1.0 EU/dL    Nitrites Negative NEG      Leukocyte Esterase Negative NEG      WBC 0-4 0 - 4 /hpf    RBC 0-5 0 - 5 /hpf    Epithelial cells FEW FEW /lpf    Bacteria Negative NEG /hpf    Hyaline cast 2-5 0 - 5 /lpf   GLUCOSE, FASTING   Result Value Ref Range    Glucose 97 65 - 100 MG/DL   LIPID PANEL   Result Value Ref Range    Cholesterol, total 205 (H) <200 MG/DL    Triglyceride 94 <150 MG/DL    HDL Cholesterol 54 MG/DL    LDL, calculated 132.2 (H) 0 - 100 MG/DL    VLDL, calculated 18.8 MG/DL    CHOL/HDL Ratio 3.8 0.0 - 5.0         Immunizations administered during this encounter: There is no immunization history on file for this patient. Screening for Metabolic Disorders for Patients on Antipsychotic Medications  (Data obtained from the EMR)    Estimated Body Mass Index  Estimated body mass index is 28.25 kg/m² as calculated from the following:    Height as of this encounter: 5' 6\" (1.676 m). Weight as of this encounter: 79.4 kg (175 lb).      Vital Signs/Blood Pressure  Visit Vitals  /66 (BP 1 Location: Left upper arm, BP Patient Position: Sitting)   Pulse 85   Temp 97.9 °F (36.6 °C)   Resp 16   Ht 5' 6\" (1.676 m)   Wt 79.4 kg (175 lb)   SpO2 98%   BMI 28.25 kg/m²       Blood Glucose/Hemoglobin A1c  Lab Results   Component Value Date/Time    Glucose 97 2021 06:17 AM       No results found for: HBA1C, [id number]     Lipid Panel  Lab Results   Component Value Date/Time    Cholesterol, total 205 (H) 2021 06:17 AM    HDL Cholesterol 54 2021 06:17 AM    LDL, calculated 132.2 (H) 2021 06:17 AM    Triglyceride 94 2021 06:17 AM    CHOL/HDL Ratio 3.8 2021 06:17 AM        Discharge Diagnosis: Schizoaffective Dx     Discharge Plan:       Dylan Vernon    NAME:Contreras Diaz  : 1985  MRN: [id number]    The patient Romie Holter exhibits the ability to control behavior in a less restrictive environment. Patient's level of functioning is improving. No assaultive/destructive behavior has been observed for the past 24 hours. No suicidal/homicidal threat or behavior has been observed for the past 24 hours. There is no evidence of serious medication side effects. Patient has not been in physical or protective restraints for at least the past 24 hours. If weapons involved, how are they secured? NA    Is patient aware of and in agreement with discharge plan? yes    Arrangements for medication:  Prescriptions printed and given to pt    Copy of discharge instructions to provider?:  Dr. Abimael Nguyễn for transportation home:  Mother will  at 13:30    Keep all follow up appointments as scheduled, continue to take prescribed medications per physician instructions. Mental health crisis number:  003 or [facility], [facility] Crisis Assessment Team (MCAT) Emergency [address]: [address] [place] [address] Emergency WARM LINE      [phone] ([phone])      M-F: 9am to 9pm      Sat & Sun: 5pm  9pm  National suicide prevention lines:                             8-476-BNQLVPG (1-571-756-591-922-0102)       1-803-626-TALK (9-449-613-786-294-2388)   24/7 Crisis Text Line:  Text HOME to [facility]    A suicide Safety Plan is a document that supports someone when they are having thoughts of suicide. Warning Signs that indicate a suicidal crisis may be developing: What (situations, thoughts, feelings, body sensations, behaviors, etc.) do you experience that lets you know you are beginning to think about suicide? 1. None of the above  2. xx  3. xx    Internal Coping Strategies:  What things can I do (relaxation techniques, hobbies, physical activities, etc.) to take my mind off my problems without contacting another person? 1. work  2. Stay active  3.  xxx    People and social settings that provide distraction: Who can I call or where can I go to distract me? 1. Name: [employer]  Phone: in phone  2. Name: xxx  Phone: xxx   3. Place: open locations            4. Place: xxx    People whom I can ask for help: Who can I call when I need help - for example, friends, family, clergy, someone else? 1. Name: family                Phone: xxx  2. Name: xxx  Phone: xxx  3. Name: xxx  Phone: xxx    Professionals or [facility] agencies I can contact during a crisis: Who can I call for help - for example, my doctor, my psychiatrist, my psychologist, a mental health provider, a suicide hotline? 1. Clinician Name: xxx   Phone: xxx      Clinician Pager or Emergency Contact #: xxx    2. Clinician Name: xxx   Phone: xxx      Clinician Pager or Emergency Contact #: xxx    3. Suicide Prevention Lifeline: 8-781-072-TALK (8903)    4. [address] [place] [address] Emergency Services -  for example, [facility] suicide hotline, [facility] Hotline: xxx      Emergency Services Address: xxx      Emergency Services Phone: xxx    Making the environment safe: How can I make my environment (house/apartment/living space) safer? For example, can I remove guns, medications, and other items? 1. I don't have any of these things. 2. xxx                  Discharge Medication List and Instructions:   Discharge Medication List as of 7/15/2021  2:28 PM      START taking these medications    Details   traZODone (DESYREL) 50 mg tablet Take 1 Tablet by mouth nightly as needed for Sleep (For insomnia). Indications: insomnia associated with depression, Print, Disp-30 Tablet, R-0      risperiDONE (RisperDAL m-tabs) 2 mg disintegrating tablet Take 1 Tablet by mouth nightly.  Indications: psychosis, Print, Disp-30 Tablet, R-0             Unresulted Labs (24h ago, onward)    None        To obtain results of studies pending at discharge, please contact [phone]    Follow-up Information     Follow up With Specialties Details Why Contact Info    Dr. Cheryl Price on 7/28/2021 2:45pm appt IN-PERSON for medication mgmt and primary care services [address]  FAX: [phone]    Other, MD [facility]    Patient can only remember the practice name and not the physician            Advanced Directive:   Does the patient have an appointed surrogate decision maker? No  Does the patient have a Medical Advance Directive? No  Does the patient have a Psychiatric Advance Directive? No  If the patient does not have a surrogate or Medical Advance Directive AND Psychiatric Advance Directive, the patient was offered information on these advance directives Patient declined to complete    Patient Instructions: Please continue all medications until otherwise directed by physician. Tobacco Cessation Discharge Plan:   Is the patient a smoker and needs referral for smoking cessation? No  Patient referred to the following for smoking cessation with an appointment? No     Patient was offered medication to assist with smoking cessation at discharge? Not applicable  Was education for smoking cessation added to the discharge instructions? Not applicable    Alcohol/Substance Abuse Discharge Plan:   Does the patient have a history of substance/alcohol abuse and requires a referral for treatment? No  Patient referred to the following for substance/alcohol abuse treatment with an appointment? No  Patient was offered medication to assist with alcohol cessation at discharge? Not applicable  Was education for substance/alcohol abuse added to discharge instructions?  Not applicable    Patient discharged to Home; discussed with patient/caregiver

## 2021-07-15 NOTE — PROGRESS NOTES
DISCHARGE SUMMARY from Nurse    PATIENT INSTRUCTIONS:    Afte  What to do at Home:  Recommended activity: Activity as tolerated,   *  Please give a list of your current medications to your Primary Care Provider. *  Please update this list whenever your medications are discontinued, doses are      changed, or new medications (including over-the-counter products) are added. *  Please carry medication information at all times in case of emergency situations. These are general instructions for a healthy lifestyle:    No smoking/ No tobacco products/ Avoid exposure to second hand smoke  Surgeon General's Warning:  Quitting smoking now greatly reduces serious risk to your health. Obesity, smoking, and sedentary lifestyle greatly increases your risk for illness    A healthy diet, regular physical exercise & weight monitoring are important for maintaining a healthy lifestyle    You may be retaining fluid if you have a history of heart failure or if you experience any of the following symptoms:  Weight gain of 3 pounds or more overnight or 5 pounds in a week, increased swelling in our hands or feet or shortness of breath while lying flat in bed. Please call your doctor as soon as you notice any of these symptoms; do not wait until your next office visit. The discharge information has been reviewed with the patient. The patient verbalized understanding. Discharge medications reviewed with the patient and appropriate educational materials and side effects teaching were provided. Private transportation to be provided by family at approximately 1. Patient received all valuables and belongings.   ___________________________________________________________________________________________________________________________________      Problem: Altered Thought Process (Adult/Pediatric)  Goal: *STG: Participates in treatment plan  Outcome: Progressing Towards Goal  ENGAGED IN TREATMENT TEAM ON CONSISTENT BASIS  Goal: *STG: Seeks staff when feelings of anxiety and fear arise  Outcome: Progressing Towards Goal   APPROPRIATE INTERACTION WITH STAFF AND PEERS  Problem: Discharge Planning  Goal: *Discharge to safe environment  Outcome: Progressing Towards Goal  PLAN IS FOR DISCHARGE HOME ACCOMPANIED BY WIFE AND MOTHER  Goal: *Knowledge of medication management  Outcome: Progressing Towards Goal  MEDICATIONS REVIEWED WITH VERBALIZED UNDERSTANDING . PRESCRIPTIONS TO BE PRINTED AND GIVEN TO PATIENT UPON DISCHARGE. Goal: *Knowledge of discharge instructions  Outcome: Progressing Towards Goal   VERBALIZES UNDERSTANDING.

## 2021-07-15 NOTE — DISCHARGE SUMMARY
PSYCHIATRIC DISCHARGE SUMMARY    Patient: Jagruti Valencia MRN: [id number]  SSN: [id number]    YOB: 1985  Age: 39 y.o. Sex: male        Date of Admission: 7/10/2021  Date of Discharge:7/15/2021      Type of Discharge:  REGULAR     Admission data:  CHIEF COMPLAINT:  \"I passed out. \"     HISTORY OF PRESENT ILLNESS:  The patient is a 23-year-old male who was initially admitted here at [address] on a temporary senior care order. He got involuntary committed from his hearing this morning. He is a not a reliable informant. He states that he was in a hotel. He got very lightheaded, passed out, maybe lost consciousness. He denies suicidal ideation or homicidal ideation. Denies paranoia or hallucination. He states that he has never been admitted in any psychiatric facility. He also tells me that he has no recollection of any incident the last few days that ultimately led to his admission here at [facility]. When he initially was admitted here at [facility] over the weekend, he refused to sign the admission paperwork, and so he was transferred back to the ED. He was then evaluated by [facility] and did not meet TDO criteria, and so he was discharged back to his hotel with his wife, but then he came back to the emergency room that same night via EMS. Apparently, a passerby called 911 because the patient was lying on the ground at the hotel parking lot. His wife had expressed concerns about his behavior, adding that he was recently diagnosed with schizophrenia. His mother also had been calling the unit and stated that the patient was admitted in a psychiatric facility in [place] weeks ago for a 4-day inpatient stay and was diagnosed with schizoaffective disorder. The wife reported on admission that the patient was \"normal\" until about 3 months ago when he started exhibiting unusual behavior. He is talking to himself.   He has been increasingly agitated, irritable, isolating and has episodes of crying. He reported that he was \"Baker Acted\" in [place] that led to his admission at [facility]. The patient is originally from [place]. They went to [place] for vacation and were driving back to [place] when they had to stop here in [place] because of his bizarre behaviors. Reportedly, his symptoms have been increasingly getting worse. He was been locking himself in the bathroom for hours, staring at himself in the mirror, crying for hours. He also has not been sleeping. There is no substance use history. There is some thought blocking noted. He appears confused, disoriented. From my understanding, his wife and his mom are on their way here to [place].   He is admitted to the inpatient psychiatric unit for further evaluation and treatment.     PAST MEDICAL HISTORY:  See H and P.          Past Medical History:   Diagnosis Date    Schizo-affective schizophrenia ([facility] [address]           Labs: (reviewed/updated 7/13/2021)  Patient Vitals for the past 8 hrs:    BP Temp Pulse Resp   07/13/21 0807 109/72 98.1 °F (36.7 °C) 96 16            Labs Reviewed   METABOLIC PANEL, COMPREHENSIVE - Abnormal; Notable for the following components:       Result Value      AST (SGOT) 13 (*)       All other components within normal limits   URINALYSIS W/MICROSCOPIC - Abnormal; Notable for the following components:     Ketone 80 (*)       All other components within normal limits   LIPID PANEL - Abnormal; Notable for the following components:     Cholesterol, total 205 (*)       LDL, calculated 132.2 (*)       All other components within normal limits   ETHYL ALCOHOL   CBC WITH AUTOMATED DIFF   CK W/ REFLX CKMB   DRUG SCREEN, URINE   COVID-19 WITH INFLUENZA A/B   GLUCOSE, FASTING            Lab Results   Component Value Date/Time     Sodium 136 07/10/2021 09:12 PM     Potassium 3.6 07/10/2021 09:12 PM     Chloride 104 07/10/2021 09:12 PM     CO2 25 07/10/2021 09:12 PM     Anion gap 7 07/10/2021 09:12 PM     Glucose 97 07/12/2021 06:17 AM     BUN 14 07/10/2021 09:12 PM     Creatinine 0.75 07/10/2021 09:12 PM     BUN/Creatinine ratio 19 07/10/2021 09:12 PM     GFR est AA >60 07/10/2021 09:12 PM     GFR est non-AA >60 07/10/2021 09:12 PM     Calcium 9.7 07/10/2021 09:12 PM     Bilirubin, total 0.9 07/10/2021 09:12 PM     Alk. phosphatase 88 07/10/2021 09:12 PM     Protein, total 7.5 07/10/2021 09:12 PM     Albumin 3.9 07/10/2021 09:12 PM     Globulin 3.6 07/10/2021 09:12 PM     A-G Ratio 1.1 07/10/2021 09:12 PM     ALT (SGPT) 23 07/10/2021 09:12 PM              Admission on 07/10/2021   Component Date Value Ref Range Status    ALCOHOL(ETHYL),SERUM 07/10/2021 <10  <10 MG/DL Final    WBC 07/10/2021 8.0  4.1 - 11.1 K/uL Final    RBC 07/10/2021 5.00  4. 10 - 5.70 M/uL Final    HGB 07/10/2021 15.0  12.1 - 17.0 g/dL Final    HCT 07/10/2021 44.4  36.6 - 50.3 % Final    MCV 07/10/2021 88.8  80.0 - 99.0 FL Final    MCH 07/10/2021 30.0  26.0 - 34.0 PG Final    MCHC 07/10/2021 33.8  30.0 - 36.5 g/dL Final    RDW 07/10/2021 12.9  11.5 - 14.5 % Final    PLATELET 83/33/3975 272  150 - 400 K/uL Final    MPV 07/10/2021 11.0  8.9 - 12.9 FL Final    NRBC 07/10/2021 0.0  0  WBC Final    ABSOLUTE NRBC 07/10/2021 0.00  0.00 - 0.01 K/uL Final    NEUTROPHILS 07/10/2021 65  32 - 75 % Final    LYMPHOCYTES 07/10/2021 27  12 - 49 % Final    MONOCYTES 07/10/2021 7  5 - 13 % Final    EOSINOPHILS 07/10/2021 1  0 - 7 % Final    BASOPHILS 07/10/2021 0  0 - 1 % Final    IMMATURE GRANULOCYTES 07/10/2021 0  0.0 - 0.5 % Final    ABS. NEUTROPHILS 07/10/2021 5.2  1.8 - 8.0 K/UL Final    ABS. LYMPHOCYTES 07/10/2021 2.1  0.8 - 3.5 K/UL Final    ABS. MONOCYTES 07/10/2021 0.5  0.0 - 1.0 K/UL Final    ABS. EOSINOPHILS 07/10/2021 0.1  0.0 - 0.4 K/UL Final    ABS. BASOPHILS 07/10/2021 0.0  0.0 - 0.1 K/UL Final    ABS. IMM.  GRANS. 07/10/2021 0.0  0.00 - 0.04 K/UL Final    DF 07/10/2021 AUTOMATED Final    CK 07/10/2021 72  39 - 308 U/L Final    Sodium 07/10/2021 136  136 - 145 mmol/L Final    Potassium 07/10/2021 3.6  3.5 - 5.1 mmol/L Final    Chloride 07/10/2021 104  97 - 108 mmol/L Final    CO2 07/10/2021 25  21 - 32 mmol/L Final    Anion gap 07/10/2021 7  5 - 15 mmol/L Final    Glucose 07/10/2021 88  65 - 100 mg/dL Final    BUN 07/10/2021 14  6 - 20 MG/DL Final    Creatinine 07/10/2021 0.75  0.70 - 1.30 MG/DL Final    BUN/Creatinine ratio 07/10/2021 19  12 - 20   Final    GFR est AA 07/10/2021 >60  >60 ml/min/1.73m2 Final    GFR est non-AA 07/10/2021 >60  >60 ml/min/1.73m2 Final    Calcium 07/10/2021 9.7  8.5 - 10.1 MG/DL Final    Bilirubin, total 07/10/2021 0.9  0.2 - 1.0 MG/DL Final    ALT (SGPT) 07/10/2021 23  12 - 78 U/L Final    AST (SGOT) 07/10/2021 13* 15 - 37 U/L Final    Alk.  phosphatase 07/10/2021 88  45 - 117 U/L Final    Protein, total 07/10/2021 7.5  6.4 - 8.2 g/dL Final    Albumin 07/10/2021 3.9  3.5 - 5.0 g/dL Final    Globulin 07/10/2021 3.6  2.0 - 4.0 g/dL Final    A-G Ratio 07/10/2021 1.1  1.1 - 2.2   Final    AMPHETAMINES 07/11/2021 Negative  NEG   Final    BARBITURATES 07/11/2021 Negative  NEG   Final    BENZODIAZEPINES 07/11/2021 Negative  NEG   Final    COCAINE 07/11/2021 Negative  NEG   Final    METHADONE 07/11/2021 Negative  NEG   Final    OPIATES 07/11/2021 Negative  NEG   Final    PCP(PHENCYCLIDINE) 07/11/2021 Negative  NEG   Final    THC (TH-CANNABINOL) 07/11/2021 Negative  NEG   Final    Drug screen comment 07/11/2021 (NOTE)    Final    SARS-CoV-2 07/11/2021 Not detected  NOTD   Final    Influenza A by PCR 07/11/2021 Not detected  NOTD   Final    Influenza B by PCR 07/11/2021 Not detected  NOTD   Final    Color 07/11/2021 YELLOW/STRAW    Final    Appearance 07/11/2021 CLEAR  CLEAR   Final    Specific gravity 07/11/2021 1.028  1.003 - 1.030   Final    pH (UA) 07/11/2021 6.0  5.0 - 8.0   Final    Protein 07/11/2021 Negative  NEG mg/dL Final  Glucose 07/11/2021 Negative  NEG mg/dL Final    Ketone 07/11/2021 80* NEG mg/dL Final    Bilirubin 07/11/2021 Negative  NEG   Final    Blood 07/11/2021 Negative  NEG   Final    Urobilinogen 07/11/2021 1.0  0.2 - 1.0 EU/dL Final    Nitrites 07/11/2021 Negative  NEG   Final    Leukocyte Esterase 07/11/2021 Negative  NEG   Final    WBC 07/11/2021 0-4  0 - 4 /hpf Final    RBC 07/11/2021 0-5  0 - 5 /hpf Final    Epithelial cells 07/11/2021 FEW  FEW /lpf Final    Bacteria 07/11/2021 Negative  NEG /hpf Final    Hyaline cast 07/11/2021 2-5  0 - 5 /lpf Final    Glucose 07/12/2021 97  65 - 100 MG/DL Final    Cholesterol, total 07/12/2021 205* <200 MG/DL Final    Triglyceride 07/12/2021 94  <150 MG/DL Final    HDL Cholesterol 07/12/2021 54  MG/DL Final    LDL, calculated 07/12/2021 132.2* 0 - 100 MG/DL Final    VLDL, calculated 07/12/2021 18.8  MG/DL Final    CHOL/HDL Ratio 07/12/2021 3.8  0.0 - 5.0   Final             Vitals:     07/12/21 0802 07/12/21 1620 07/12/21 1940 07/13/21 0807   BP: 117/74 102/66 104/73 109/72   Pulse: 76 78 87 96   Resp: 16 16 16 16   Temp: 97.8 °F (36.6 °C) 98 °F (36.7 °C) 97.5 °F (36.4 °C) 98.1 °F (36.7 °C)   SpO2: 98% 97% 99%     Weight:           Height:              Recent Results   No results found for this or any previous visit (from the past 24 hour(s)).        RADIOLOGY REPORTS:  No results found for this or any previous visit. CT HEAD WO CONT     Result Date: 7/10/2021  EXAM: CT HEAD WO CONT INDICATION: Head injury. COMPARISON: None. CONTRAST: None. TECHNIQUE: Unenhanced CT of the head was performed using 5 mm images. Brain and bone windows were generated. Coronal and sagittal reformats. CT dose reduction was achieved through use of a standardized protocol tailored for this examination and automatic exposure control for dose modulation. FINDINGS: The ventricles and sulci are normal in size, shape and configuration. . There is no significant white matter disease.  There is no intracranial hemorrhage, extra-axial collection, or mass effect. The basilar cisterns are open. No CT evidence of acute infarct. The bone windows demonstrate no abnormalities. The visualized portions of the paranasal sinuses and mastoid air cells are clear.      No acute findings.     CT SPINE CERV WO CONT     Result Date: 7/10/2021  INDICATION: fall, neck pain. Exam: Noncontrast CT of the cervical spine is performed with 2.5 mm collimation. Sagittal and coronal reformatted images were also performed. CT dose reduction was achieved through use of a standardized protocol tailored for this examination and automatic exposure control for dose modulation. FINDINGS: There is no acute fracture or subluxation. The prevertebral soft tissues are within normal limits. Bones are well mineralized. Remaining visualized soft tissues are normal.      No acute fracture or subluxation.                         PAST PSYCHIATRIC HISTORY:  The patient had no prior history until about 3 months ago when he started acting bizarre that led to his admission in a psychiatric facility in [place] 4 weeks ago, and he stayed for 4 days. Unfortunately, his family could not remember what medications he was on. He has been diagnosed with schizophrenia versus schizoaffective disorder per his family.     PSYCHOSOCIAL HISTORY:  He has been  for a little over a year. He has no children. 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 He is originally from [address] [place] [address]:  The patient is sitting in chair, dressed in street clothes. He reports his mood is okay. Affect is constricted. Speech:  Soft, incoherent. He denies suicidal ideation, homicidal ideation, or auditory or visual hallucination, but thought blocking is noted. Paranoia is noted. Cognitively, he is awake and alert, but a formal memory testing was not completed. Insight is poor.   Judgment is poor.     DIAGNOSIS:  Unspecified psychotic disorder, ruled out schizophreniform disorder. Hospital Course:    Patient was admitted to the Psychiatric services for acute psychiatric stabilization in regards to symptomatology as described in the HPI above and placed on Q15 minute checks and suicide precautions. *Standing medications were ordered. He was started on risperdal. While on the unit Linda Youngblood was involved in individual, group, occupational and milieu therapy. He improved gradually and was able to integrate into the milieu with help from the nursing staff. Patients symptoms improved gradually including psychosis (paranoia, hallucinations,) bizarre behaviors. He was appropriate in his interactions, and cooperative with medications and the unit routine. Please see individual progress notes for more specific details regarding patient's hospitalization course. Patient was discharged as per the plan. He had been doing well on the unit as per the report of the nursing staff and my observations. No PRN medication for agitation, seclusion or restraints were required during the last 48 hours of his stay. Linda Youngblood had improved progressively to the point of being stable for discharge and outpatient FU. At this time he did not offer any complaints. Patient denied any SI or HI. Denied any AH or VH. He denied any delusions. Was not considered a danger to self or to others and is safe for discharge. Will FU with his appointments and remains motivated to be in treatment. The patient verbalized understanding of his discharge instructions. Allergies:(reviewed/updated 7/15/2021)  No Known Allergies    Side Effects: (reviewed/updated 7/15/2021)  None reported or admitted to.     Vital Signs:  Patient Vitals for the past 24 hrs:   Temp Pulse Resp BP SpO2   07/15/21 0811 97.9 °F (36.6 °C) 85 16 113/66 98 %   07/14/21 1628 98.2 °F (36.8 °C) 75 18 101/64 97 %     Wt Readings from Last 3 Encounters:   07/11/21 79.4 kg (175 lb)     Temp Readings from Last 3 Encounters:   07/15/21 97.9 °F (36.6 °C)   07/10/21 98.5 °F (36.9 °C)     BP Readings from Last 3 Encounters:   07/15/21 113/66   07/10/21 134/80   07/09/21 125/79     Pulse Readings from Last 3 Encounters:   07/15/21 85   07/10/21 78   07/09/21 77       Labs: (reviewed/updated 7/15/2021)  No results found for this or any previous visit (from the past 24 hour(s)). No results found for: VALF2, VALAC, VALP, VALPR, DS6, CRBAM, CRBAMP, CARB2, XCRBAM  No results found for: [facility]    Radiology (reviewed/updated 7/15/2021)  CT HEAD WO CONT    Result Date: 7/10/2021  EXAM: CT HEAD WO CONT INDICATION: Head injury. COMPARISON: None. CONTRAST: None. TECHNIQUE: Unenhanced CT of the head was performed using 5 mm images. Brain and bone windows were generated. Coronal and sagittal reformats. CT dose reduction was achieved through use of a standardized protocol tailored for this examination and automatic exposure control for dose modulation. FINDINGS: The ventricles and sulci are normal in size, shape and configuration. . There is no significant white matter disease. There is no intracranial hemorrhage, extra-axial collection, or mass effect. The basilar cisterns are open. No CT evidence of acute infarct. The bone windows demonstrate no abnormalities. The visualized portions of the paranasal sinuses and mastoid air cells are clear. No acute findings. CT SPINE CERV WO CONT    Result Date: 7/10/2021  INDICATION: fall, neck pain. Exam: Noncontrast CT of the cervical spine is performed with 2.5 mm collimation. Sagittal and coronal reformatted images were also performed. CT dose reduction was achieved through use of a standardized protocol tailored for this examination and automatic exposure control for dose modulation. FINDINGS: There is no acute fracture or subluxation. The prevertebral soft tissues are within normal limits. Bones are well mineralized.  Remaining visualized soft tissues are normal.     No acute fracture or subluxation. Mental Status Exam on Discharge:  General appearance:   Sarah Buchanan is a 39 y.o. DECLINED male who is well groomed, psychomotor activity is WNL  Eye contact: makes good eye contact  Speech: Spontaneous and coherent  Affect : Euthymic  Mood: \"OK\"  Thought Process: Logical, goal directed  Perception: Denies any AH or VH. Thought Content: Denies any SI or Plan  Insight: Partial  Judgement: Fair  Cognition: Intact grossly. Discharge Diagnosis:  Unspecified psychotic disorder. Current Discharge Medication List      START taking these medications    Details   traZODone (DESYREL) 50 mg tablet Take 1 Tablet by mouth nightly as needed for Sleep (For insomnia). Indications: insomnia associated with depression  Qty: 30 Tablet, Refills: 0  Start date: 7/15/2021      risperiDONE (RisperDAL m-tabs) 2 mg disintegrating tablet Take 1 Tablet by mouth nightly. Indications: psychosis  Qty: 30 Tablet, Refills: 0  Start date: 7/15/2021                  Follow-up Information     Follow up With Specialties Details Why Contact Info    Dr. Indu Tellez on 7/28/2021 2:45pm appt IN-PERSON for medication mgmt and primary care services [address]  FAX: [phone]    Other, MD Bakari    Patient can only remember the practice name and not the physician          WOUND CARE: none needed. Prognosis:   Good / [address] based on nature of patient's pathology/ies and treatment compliance issues. Prognosis is greatly dependent upon patient's ability to  follow up on psychiatric/psychotherapy appointments as well as to comply with psychiatric medications as prescribed.        I certify that this patients inpatient psychiatric hospital services furnished since the previous certification were, and continue to be, required for treatment that could reasonably be expected to improve the patient's condition, or for diagnostic study, and that the patient continues to need, on a daily basis, active treatment furnished directly by or requiring the supervision of inpatient psychiatric facility personnel. In addition, the hospital records show that services furnished were intensive treatment services, admission or related services, or equivalent services.      Signed:  Noy Venegas NP  7/15/2021

## 2021-07-15 NOTE — DISCHARGE INSTRUCTIONS
DISCHARGE SUMMARY    NAME:Contreras Diaz  : 1985  MRN: [id number]    The patient Megan Dumont exhibits the ability to control behavior in a less restrictive environment. Patient's level of functioning is improving. No assaultive/destructive behavior has been observed for the past 24 hours. No suicidal/homicidal threat or behavior has been observed for the past 24 hours. There is no evidence of serious medication side effects. Patient has not been in physical or protective restraints for at least the past 24 hours. If weapons involved, how are they secured? NA    Is patient aware of and in agreement with discharge plan? yes    Arrangements for medication:  Prescriptions printed and given to pt    Copy of discharge instructions to provider?:  Dr. Winston Martinez for transportation home:  Mother will  at 13:30    Keep all follow up appointments as scheduled, continue to take prescribed medications per physician instructions. Mental health crisis number:  495 or [facility], [facility] Crisis Assessment Team (MCAT) Emergency  [address]: [address] [place] [address] Emergency WARM LINE      [phone]-[facility] ([phone])      M-F: 9am to 9pm      Sat & Sun: 5pm - 9pm  National suicide prevention lines:                             7-294-WZVPVDS (1-709-507-273-507-9935)       8-955-111-TALK ([phone])    Crisis Text Line:  Text HOME to [address] [place] [address] Loop    A suicide Safety Plan is a document that supports someone when they are having thoughts of suicide. Warning Signs that indicate a suicidal crisis may be developing: What (situations, thoughts, feelings, body sensations, behaviors, etc.) do you experience that lets you know you are beginning to think about suicide? 1.  None of the above  2. xx  3. xx    Internal Coping Strategies:  What things can I do (relaxation techniques, hobbies, physical activities, etc.) to take my mind off my problems without contacting another person? 1. work  2. Stay active  3. xxx    People and social settings that provide distraction: Who can I call or where can I go to distract me? 1. Name: [employer]  Phone: in phone  2. Name: xxx  Phone: xxx   3. Place: open locations            4. Place: xxx    People whom I can ask for help: Who can I call when I need help - for example, friends, family, clergy, someone else? 1. Name: family                Phone: xxx  2. Name: xxx  Phone: xxx  3. Name: xxx  Phone: xxx    Professionals or [employer] agencies I can contact during a crisis: Who can I call for help - for example, my doctor, my psychiatrist, my psychologist, a mental health provider, a suicide hotline? 1. Clinician Name: xxx   Phone: xxx      Clinician Pager or Emergency Contact #: xxx    2. Clinician Name: xxx   Phone: xxx      Clinician Pager or Emergency Contact #: xxx    3. Suicide Prevention Lifeline: 8-005-345-TALK (2743)    4. [address] [place] [address] Emergency Services -  for example, [facility] suicide hotline, [facility] Hotline: xxx      Emergency Services Address: xxx      Emergency Services Phone: xxx    Making the environment safe: How can I make my environment (house/apartment/living space) safer? For example, can I remove guns, medications, and other items? 1. I don't have any of these things.   2. xxx

## 2021-07-20 NOTE — PROGRESS NOTES
Reached out to patient at [phone] for follow up. Patient indicated he is doing well and aware of his follow up appointment.

## 2022-03-20 PROBLEM — F25.9 SCHIZOAFFECTIVE DISORDER (HCC): Status: ACTIVE | Noted: 2021-07-10

## 2023-05-14 RX ORDER — TRAZODONE HYDROCHLORIDE 50 MG/1
50 TABLET ORAL
COMMUNITY
Start: 2021-07-15

## 2023-05-14 RX ORDER — RISPERIDONE 2 MG/1
2 TABLET, ORALLY DISINTEGRATING ORAL
COMMUNITY
Start: 2021-07-15